# Patient Record
Sex: FEMALE | Race: WHITE | Employment: FULL TIME | URBAN - METROPOLITAN AREA
[De-identification: names, ages, dates, MRNs, and addresses within clinical notes are randomized per-mention and may not be internally consistent; named-entity substitution may affect disease eponyms.]

---

## 2017-09-20 ENCOUNTER — HOSPITAL ENCOUNTER (OUTPATIENT)
Dept: PHYSICAL THERAPY | Age: 42
Discharge: HOME | End: 2017-09-20
Payer: OTHER GOVERNMENT

## 2017-09-20 PROCEDURE — 97162 PT EVAL MOD COMPLEX 30 MIN: CPT

## 2017-09-20 PROCEDURE — 97110 THERAPEUTIC EXERCISES: CPT

## 2017-09-20 PROCEDURE — 97530 THERAPEUTIC ACTIVITIES: CPT

## 2017-09-20 PROCEDURE — 97014 ELECTRIC STIMULATION THERAPY: CPT

## 2017-09-20 NOTE — PROGRESS NOTES
Jim [address]oudreaux [address]  [facility] PHYSICAL THERAPY  [address] [facility], [address] - Phone: [phone]  Fax: [phone] / [address] [facility]  Patient Name: Martin Salinas : 1975   Medical   Diagnosis: Lower back pain [M54.5] Treatment Diagnosis: LBP, Bilateral SIJ pain   Onset Date:  with recent exacerbation AGE: 36 y.o. Referral Source: Unknown, Provider, MD Start of Care [facility]): 2017   Prior Hospitalization: See medical history Provider #: [id number]   Prior Level of Function: Independent with limitations with high level activity/recreation   Comorbidities: L5-S1 Discectomy    Medications: Verified on Patient Summary List   The Plan of Care and following information is based on the information from the initial evaluation.   =======================================================================================  Assessment / key information:  Patient is a 36 y.o. female who presents with chief complaint of low back pain. Pt reports initial onset of LBP in  stating \"back went out\" following lifting incident. Patient reports episode of steroid injections were used to address pain. In , pt reports pain began to progress and underwent L5-S1 discectomy in . Patient reports significant improvement in symptoms following surgery however experienced setback in  with progressive worsening of LBP symptoms that peaked following attempt to run 5K this past summer. Pt reports she was bed ridden x 2 weeks due to severe pain. During evaluation, patient demonstrates dec lumbar extension AROM, increased pain with both flexion and extension repeated movement screen, dec LE strength L > R, 1+ hyporeflexia BLE, extreme tenderness to palpation bilateral SIJ, TTP bilateral lumbar paraspinals, and + thigh thrust/gapping/compression tests indicative of SIJ pathology.  Patient reports that symptoms limit prolonged standing, sitting, painful transfers, decreased ambulation tolerance and inability to engage in normal hobbies and recreational activities, running in particular. Patient would benefit from skilled PT services to address these issues and improve the above mentioned impairment. Thank you for this referral       ** In addition, patient c/o saddle anaesthesia and difficulty with bowel movements. This, in addition to BLE hyporeflexia and reports of BLE weakness make me concerned over possible cauda equina syndrome. Patient may benefit from MRI prior to PT treatment in order to address this concern.     Objective Data:     LE Strength         Strength (1-5)  Hip Left Right   Flexion (L1,L2) 4 5   Extension 3+ 3+   Abduction 4- 4   Adduction       ER 4+ 4+   IR 4+ 4+   Knee Left Right   Extension (L3,L4) 5 5   Flexion (S1,S2) 4 4   Ankle Left Right   PF (S1)       DF (L4) 5 5      ======================================================================================  Eval Complexity: History: HIGH Complexity :3+ comorbidities / personal factors will impact the outcome/ POC Exam:HIGH Complexity : 4+ Standardized tests and measures addressing body structure, function, activity limitation and / or participation in recreation  Presentation: MEDIUM Complexity : Evolving with changing characteristics  Clinical Decision Making:MEDIUM Complexity : FOTO score of 26-74Overall Complexity:MEDIUM  Problem List: pain affecting function, decrease ROM, decrease strength, edema affecting function, impaired gait/ balance, decrease ADL/ functional abilitiies, decrease activity tolerance, decrease flexibility/ joint mobility and decrease transfer abilities   Treatment Plan may include any combination of the following: Therapeutic exercise, Therapeutic activities, Neuromuscular re-education, Physical agent/modality, Gait/balance training, Manual therapy, Aquatic therapy, Patient education, Self Care training, Functional mobility training, Home safety training and Stair training  Patient / Family readiness to learn indicated by: asking questions, trying to perform skills and interest  Persons(s) to be included in education: patient (P)  Barriers to Learning/Limitations: None  Measures taken:    Patient Goal (s): \"strength back, more active like before. \"   Patient self reported health status: good  Rehabilitation Potential: good   Short Term Goals: To be accomplished in  3-4  Weeks:  1. Patient will be compliant with HEP in order to facilitate progression of therapeutic exercise and improve mobility  2. Patient will improve gross LE strength to >/= 4-5 in order to decrease stress on low back  3. Patient will report at least 25% improvement in pain symptoms since IE to improve quality of life and ease with ADLs     Long Term Goals: To be accomplished in  6-8  Weeks:  1. Patient will be independent with HEP in order to demonstrate ability to perform therapeutic exercises and continue progressing functional mobility upon D/C  2. Patient will report ability to walk > 1 mile without inc LBP/SIJ symptoms to improve ease with community ambulation  3. Patient will improve FOTO score to 70% to demonstrate a meaningful improvement in functional mobility and increased quality of life  4. Patient will report at least 75% improvement in sitting/standing tolerance to improve ability to perform work duties  5. Patient will demonstrate ability to initiate return to running program to return to recreational activities      Frequency / Duration:   Patient to be seen  2  times per week for 4-8  weeks:  Patient / Caregiver education and instruction: self care, activity modification and exercises  G-Codes (GP): n/a  Therapist Signature:  Ami Andrade DPT Date: 6/57/4819   Certification Period: n/a Time: 12:43 PM   ===========================================================================================  I certify that the above Physical Therapy Services are being furnished while the patient is under my care. I agree with the treatment plan and certify that this therapy is necessary. Physician Signature:        Date:       Time:     Please sign and return to [employer] or you may fax the signed copy to [phone]. Thank you.

## 2017-09-20 NOTE — PROGRESS NOTES
PHYSICAL THERAPY - DAILY TREATMENT NOTE    Patient Name: Min Gale        Date: 2017  : 1975   YES Patient  Verified  Visit #:   1     Insurance: Payor:  / Plan: [employer] [facility] [address] AND DEPENDENTS / Product Type:  /      In time: 3:05 Out time: 4:25   Total Treatment Time: 80 min     TREATMENT AREA =  Low back pain    SUBJECTIVE    Pain Level (on 0 to 10 scale):  5  / 10   Medication Changes/New allergies or changes in medical history, any new surgeries or procedures? NO    If yes, update Summary List   Subjective Functional Status/Changes:  []  No changes reported     Patient reports her \"back went out in \" when she was carrying something heavy. States that would get shots to address pain. Started having more pain in  and pain got progressively worse. Pt stated she started going through physical therapy and tried lumbar traction which made it worse. Stated that she was prescribed heavy medication and epidurals and a pain block. Stated that she lost feeling in her R leg and they decided to do surgery. Patient stated she had a discectomy in . Pt continued to have some slow reflexes on the right side but strength was OK. States her back locked on her again in . She tried to run a 5K over this past summer and after it was over she \"couldn't move for over a week or two. \" Patient states that her back tweaked and \"went out\" again just bending over to  her boots. States she got a shot of Tramadol 2 weeks ago. Patient states she has been favoring her back since then. She states that she has been using a TENS unit which has helped a lot. Pt reports achiness the front of her legs as well when she sits for a long time. States she gets severe spasms in her R calf as well.        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 Sleeps supine with BLE lifted    Symptoms:  Pain rating (0-10): Today: 5/10   Best: 0/10    Worst:      [] Contstant:    [x] Intermittent:     Aggravated by: squatting   [] Bending [x] Sitting [x] Standing [] Walking   [] Moving [] Cough [] Sneeze [] Valsalva   [] AM  [] PM  Lying:  [] sup   [] pro   [] sidelying   [] Other:     Eased by:  Supine with BLEs on wedge, flexeril, TENS   [] Bending [] Sitting [] Standing [] Walking   [] Moving [] AM  [] PM  Lying: [] sup  [] pro  [] sidelying   [] Other:     General Health:  Red Flags Indicated? [] Yes    [] No  [] Yes [x] No Recent weight change (If yes, due to dieting?  [] Yes  [] No)   [] Yes [x] No Weakness in legs during walking  [] Yes [x] No Unremitting pain at night  [] Yes [x] No Abdominal pain or problems  [] Yes [x] No Rectal bleeding  [] Yes [x] No Feet more cold or painful in cold weather  [] Yes [x] No Menstrual irregularities  [] Yes [x] No Blood or pain with urination  [] Yes [x] No Dysfunction of bowel or bladder  [] Yes [x] No Recent illness within past 3 weeks (i.e, cold, flu)  [] Yes [x] No Numbness/tingling in buttock/genitalia region    Past History/Treatments:     Diagnostic Tests: [] Lab work [] X-rays    [] CT [] MRI     [] Other:  Results: n/a    OBJECTIVE   Posture:  Lateral Shift: [x] R    [x] L     [] +  [x] -  Kyphosis: [] Increased [] Decreased   [x]  WNL  Lordosis:  [] Increased [x] Decreased   [] WNL  Pelvic symmetry: [x] WNL    [] Other:    Gait:  [x] Normal     [] Abnormal:    Active Movements: [] N/A   [] Too acute   [] Other:  ROM AROM Comments:pain, area   Forward flexion 40-60 WNL    Extension 20-30 25% decr pain   SB right 20-30 WNL    SB left 20-30 WNL    Rotation right 5-10 WNL    Rotation left 5-10 WNL        Repeated Movements  Effects on present pain: produces (KY), abolishes (A), increases (incr), decreases(decr), centralizes (C), peripheral (PH), no effect (NE)   Pre-Test Sx Flexion Repeated Flexion Extension Repeated Extension Repeated SBL Repeated SBR   Sitting  Incr Incr incr incr NT NT   Standing  NE NE NE NE Incr NE   Lying  NT  Incr  N/A N/A   Comments: L posterior hip pain with repeated flexion, similar with extension      LE Strength         Strength (1-5)  Hip Left Right   Flexion (L1,L2) 4 5   Extension 3+ 3+   Abduction 4- 4   Adduction     ER 4+ 4+   IR 4+ 4+   Knee Left Right   Extension (L3,L4) 5 5   Flexion (S1,S2) 4 4   Ankle Left Right   PF (S1)     DF (L4) 5 5     Neuro Screen [] WNL  Myotome/Dermatome/Reflexes: Dec RLE knee ext, dec LE strength per above    Dural Mobility:  SLR Sitting: [] R    [] L    [] +    [x] -  @ (degrees):           Supine: [] R    [] L    [] +    [x] -  @ (degrees):   Slump Test: [] R    [] L    [] +    [x] -  @ (degrees):   Prone Knee Bend: [] R    [] L    [] +    [x] -     Palpation  [] Min  [] Mod  [x] Severe    Location: SIJ bilaterally, glut med/min bilaterally  [] Min  [] Mod  [] Severe    Location:  [] Min  [] Mod  [] Severe    Location:    Special Tests    Sacroilliac:  Gaenslen's: [x] R    [x] L    [x] +    [] -     Compression: [x] +    [] -     Gapping:  [x] +    [] -     Thigh Thrust: [x] R    [x] L    [x] +    [] -     Leg Length: [] +    [x] -   Position: supine and hooklying    Crests: WNL    ASIS: Equal    PSIS:    Sacral Sulcus: Painful palpation bilaterally         Hip: Estela Betty:  [x] R    [x] L    [] +    [x] -     Scour:  [x] R    [x] L    [] +    [x] -     Piriformis: [x] R    [x] L    [] +    [x] -    Modalities Rationale:  10 min [x] Estim, type/location:  IFC to L/S and SIJ  Bilaterally supine with MHP                                    []  att     [x]  unatt     []  w/US     []  w/ice    [x]  w/heat    min []  Mechanical Traction: type/lbs                   []  pro   []  sup   []  int   []  cont    []  before manual    []  after manual    min []  Ultrasound, settings/location:      min []  Iontophoresis w/ dexamethasone, location:                                               []  take home patch       []  in clinic    min []  Ice     []  Heat    location/position:     min []  Vasopneumatic Device, press/temp:     min []  Other:    [x] Skin assessment post-treatment (if applicable):    [x]  intact    []  redness- no adverse reaction     []redness  adverse reaction:      10 min Therapeutic Exercise:  [x]  See flow sheet   Rationale:      increase ROM, increase strength, improve coordination, improve balance and increase proprioception to improve the patients ability to perform ADL's and functional mobility with increased ease and efficiency of movement      8 min Therapeutic Activity: FOTO   Rationale: To assess current functional limitations and review POC    throughout min Patient Education:  YES  Reviewed HEP   []  Progressed/Changed HEP based on:         Other Objective/Functional Measures:    See objective data above     Post Treatment Pain Level (on 0 to 10) scale:   0  / 10     ASSESSMENT    Assessment/Changes in Function:     Patient History: High (Hx L5-S1 discectomy, Hx failed conservative treatment)  Examination (low 1-2, mod 3+, high 4+): High per objective above  Clinical Presentation (low stable or uncomplicated, mod evolving or changing, high unstable or unpredictable): High    Clinical Decision Making (low , mod 26-74, high 1-25): FOTO Moderate     [x]  See Progress Note/Recertification   Patient will continue to benefit from skilled PT services to modify and progress therapeutic interventions, address functional mobility deficits, address ROM deficits, address strength deficits, analyze and address soft tissue restrictions, analyze and cue movement patterns, analyze and modify body mechanics/ergonomics, assess and modify postural abnormalities, address imbalance/dizziness and instruct in home and community integration to attain remaining goals.    Progress toward goals / Updated goals:    See plan of care     PLAN    [x]  Upgrade activities as tolerated YES Continue plan of care   []  Discharge due to :    []  Other:

## 2017-09-22 ENCOUNTER — HOSPITAL ENCOUNTER (OUTPATIENT)
Dept: PHYSICAL THERAPY | Age: 42
Discharge: HOME | End: 2017-09-22
Payer: OTHER GOVERNMENT

## 2017-09-22 PROCEDURE — 97530 THERAPEUTIC ACTIVITIES: CPT

## 2017-09-22 PROCEDURE — 97110 THERAPEUTIC EXERCISES: CPT

## 2017-09-22 NOTE — PROGRESS NOTES
PHYSICAL THERAPY - DAILY TREATMENT NOTE    Patient Name: Partha Cordova        Date: 2017  : 1975   YES Patient  Verified  Visit #:   2     Insurance: Payor:  / Plan: Kristopher Mascorro DEPENDENTS / Product Type:  /      In time: 2:11 Out time: 3:26   Total Treatment Time: 76     Medicare Time Tracking (below)   Total Timed Codes (min):  NA 1:1 Treatment Time:  NA     TREATMENT AREA =  Lower back pain [M54.5]    SUBJECTIVE    Pain Level (on 0 to 10 scale):  3  / 10   Medication Changes/New allergies or changes in medical history, any new surgeries or procedures? NO     If yes, update Summary List   Subjective Functional Status/Changes:  []  No changes reported     \"I have a lot of pain back here right where he was touching last time. \"          OBJECTIVE    Modalities Rationale:  palliative      min [] Estim, type/location:                                      []  att     []  unatt     []  w/US     []  w/ice    []  w/heat    min []  Mechanical Traction: type/lbs                   []  pro   []  sup   []  int   []  cont    []  before manual    []  after manual    min []  Ultrasound, settings/location:      min []  Iontophoresis w/ dexamethasone, location:                                               []  take home patch       []  in clinic   10 min [x]  Ice     []  Heat    location/position: Prone, sacrum    min []  Vasopneumatic Device, press/temp:     min []  Other:    [x] Skin assessment post-treatment (if applicable):   [x]  intact    []  redness- no adverse reaction     []redness  adverse reaction:    40 min Therapeutic Exercise:  [x]  See flow sheet   Rationale:      increase ROM and increase strength to improve the patients ability to perform ADL's with improve pelvic and core stability.      25 min Manual Therapy: MET to correct L upshift and anteior rotated L ilium- R over L, soft-tissue massage B adductors   Rationale:      decrease pain, increase ROM and increase tissue extensibility to improve patient's ability to perform ADL's with improved activity tolerance. min Patient Education:  YES  Reviewed HEP   []  Progressed/Changed HEP based on:   Patient reports compliance     Other Objective/Functional Measures:    Pt symptoms made no change with R over L MET, L upshift correction abolished symptoms in standing; however, pt still is TTP over B PSIS especially in supine. Pt is also TTP over B adductors which decreased after manual interventions described above. See flowsheet for more details. Post Treatment Pain Level (on 0 to 10) scale:   0  / 10     ASSESSMENT    Assessment/Changes in Function:     Pt presents to PT making good progress in symptom management and able to iniate a core stability program without an increase in symptoms. []  See Progress Note/Recertification   Patient will continue to benefit from skilled PT services to modify and progress therapeutic interventions, address functional mobility deficits, address ROM deficits, address strength deficits, analyze and address soft tissue restrictions, analyze and cue movement patterns and analyze and modify body mechanics/ergonomics to attain remaining goals.    Progress toward goals / Updated goals:    First follow-up since eval.     PLAN    [x]  Upgrade activities as tolerated YES Continue plan of care   []  Discharge due to :    []  Other:      Therapist: Leticia Martinez    Date: 9/22/2017 Time: 3:45 PM     Future Appointments  Date Time Provider Jj Guevara   9/25/2017 2:30 PM [facility]   [facility]   10/2/2017 2:30 PM Kali LOPEZ [facility]   10/4/2017 2:30  [facility]   10/16/2017 10:00 AM Ingrid Marquez [address] [facility]   10/18/2017 2:00 PM [facility]   10/23/2017 2:30 PM [facility]   10/25/2017 9:30 AM [facility]

## 2017-09-25 ENCOUNTER — HOSPITAL ENCOUNTER (OUTPATIENT)
Dept: PHYSICAL THERAPY | Age: 42
Discharge: HOME | End: 2017-09-25
Payer: OTHER GOVERNMENT

## 2017-09-25 PROCEDURE — 97110 THERAPEUTIC EXERCISES: CPT

## 2017-09-25 PROCEDURE — 97140 MANUAL THERAPY 1/> REGIONS: CPT

## 2017-09-25 PROCEDURE — 97014 ELECTRIC STIMULATION THERAPY: CPT

## 2017-09-25 NOTE — PROGRESS NOTES
PHYSICAL THERAPY - DAILY TREATMENT NOTE      Patient Name: Kathryn Tyson        Date: 2017  : 1975   YES Patient  Verified  Visit #:   3   of     Insurance: Payor:  / Plan: [employer] [place] DEPENDENTS / Product Type:  /      In time: 2:18 Out time: 3:25   Total Treatment Time: 67 min       TREATMENT AREA =  Lower back pain [M54.5]    SUBJECTIVE    Pain Level (on 0 to 10 scale):  -3  / 10   Medication Changes/New allergies or changes in medical history, any new surgeries or procedures? NO    If yes, update Summary List   Subjective Functional Status/Changes:  []  No changes reported     \"It felt really good when I left last time. It started to hurt later in the day and I took it easy over the weekend. It feels good now. \"        OBJECTIVE    Modalities Rationale:  15 min [x] Estim, type/location: IFC to bilateral SIJ supine BLEs on wedge                                     []  att     [x]  unatt     []  w/US     []  w/ice    [x]  w/heat    min []  Mechanical Traction: type/lbs                   []  pro   []  sup   []  int   []  cont    []  before manual    []  after manual    min []  Ultrasound, settings/location:      min []  Iontophoresis w/ dexamethasone, location:                                               []  take home patch       []  in clinic    min []  Ice     []  Heat    location/position:     min []  Vasopneumatic Device, press/temp:     min []  Other:    [] Skin assessment post-treatment (if applicable):    [x]  intact    []  redness- no adverse reaction     []redness  adverse reaction:      44 min Therapeutic Exercise:  [x]  See flow sheet   Rationale:      increase ROM, increase strength, improve coordination, improve balance and increase proprioception to improve the patients ability to increase pt's stability/mobility and improve functional activity and ability to perform ADL's    8 min Manual Therapy: STM/TrP bilateral adductors, shotgun technique   Rationale:      decrease pain, increase ROM, increase tissue extensibility, decrease trigger points and increase postural awareness to improve patient's ability to perform functional activities and decrease pain. 1 min Patient Education:  YES  Reviewed HEP   []  Progressed/Changed HEP based on: Other Objective/Functional Measures:     Pt with continued TTP over bilateral gluteals along origin at iliac crest. Audible cavitation L pubis with shotgun. Pelvic asymmetry assessed and no obliquity found upon palpation today. Progressed LE/core therex per flowsheet  Tolerated walking TA marches well. Added 5# KB hold during second set and patient tolerated without inc pain. Post Treatment Pain Level (on 0 to 10) scale:   0  / 10     ASSESSMENT    Assessment/Changes in Function:     Patient progressing tolerance for LE/core therex. Will continue to progress reps/sets to improve muscular endurance and to improve. Patient continues to respond very well to use of IFC/TENS to low back/SIJ. Patient would benefit from home TENS unit to decrease pain and improved tolerance for ADL and self care tasks. []  See Progress Note/Recertification   Patient will continue to benefit from skilled PT services to modify and progress therapeutic interventions, address functional mobility deficits, address ROM deficits, address strength deficits, analyze and address soft tissue restrictions, analyze and cue movement patterns, analyze and modify body mechanics/ergonomics, assess and modify postural abnormalities, address imbalance/dizziness and instruct in home and community integration to attain remaining goals. Progress toward goals / Updated goals: · Short Term Goals: To be accomplished in  3-4  Weeks:  1. Patient will be compliant with HEP in order to facilitate progression of therapeutic exercise and improve mobility  2.  Patient will improve gross LE strength to >/= 4-5 in order to decrease stress on low back. Progressed core and LE therex  3. Patient will report at least 25% improvement in pain symptoms since IE to improve quality of life and ease with ADLs.        PLAN    [x]  Upgrade activities as tolerated YES Continue plan of care   []  Discharge due to :    []  Other:      Therapist: Carmencita Ornelas DPT    Date: 9/25/2017 Time: 10:08 AM     Future Appointments  Date Time Provider Jj Guevara   9/25/2017 2:30 PM [facility]   9/27/2017 3:00 PM [facility]   10/2/2017 2:30 PM [facility]   10/4/2017 2:30 PM [facility]   10/16/2017 10:00 AM Devonte Gene [address] [place] [address]   10/18/2017 2:00 PM [facility]   10/23/2017 2:30 PM [facility]   10/25/2017 9:30 AM [facility]

## 2017-09-27 ENCOUNTER — HOSPITAL ENCOUNTER (OUTPATIENT)
Dept: PHYSICAL THERAPY | Age: 42
Discharge: HOME | End: 2017-09-27
Payer: OTHER GOVERNMENT

## 2017-09-27 PROCEDURE — 97110 THERAPEUTIC EXERCISES: CPT

## 2017-09-27 PROCEDURE — 97140 MANUAL THERAPY 1/> REGIONS: CPT

## 2017-09-27 NOTE — PROGRESS NOTES
PHYSICAL THERAPY - DAILY TREATMENT NOTE    Patient Name: Gena Harrison        Date: 2017  : 1975   YES Patient  Verified  Visit #:   4     Insurance: Payor:  / Plan: Kristopher Mascorro DEPENDENTS / Product Type:  /      In time: 3:00 Out time: 4:00   Total Treatment Time: 60     Medicare Time Tracking (below)   Total Timed Codes (min):  NA 1:1 Treatment Time:  NA     TREATMENT AREA =  Lower back pain [M54.5]    SUBJECTIVE    Pain Level (on 0 to 10 scale):  0  / 10   Medication Changes/New allergies or changes in medical history, any new surgeries or procedures? NO     If yes, update Summary List   Subjective Functional Status/Changes:  []  No changes reported     \"I went to work today and I haven't had any pain. Its a little better. \"          OBJECTIVE    Modalities Rationale:  palliative      min [] Estim, type/location:                                      []  att     []  unatt     []  w/US     []  w/ice    []  w/heat    min []  Mechanical Traction: type/lbs                   []  pro   []  sup   []  int   []  cont    []  before manual    []  after manual    min []  Ultrasound, settings/location:      min []  Iontophoresis w/ dexamethasone, location:                                               []  take home patch       []  in clinic   10 min [x]  Ice     []  Heat    location/position: Supine with LE's elevated    min []  Vasopneumatic Device, press/temp:     min []  Other:    [x] Skin assessment post-treatment (if applicable):   [x]  intact    []  redness- no adverse reaction     []redness  adverse reaction:     42 min Therapeutic Exercise:  [x]  See flow sheet   Rationale:      increase ROM and increase strength to improve the patients ability to perform ADL's with improved core stability.      8 min Manual Therapy: R inominate outflare and R LE upshift MET correction   Rationale:      decrease pain, increase ROM and increase tissue extensibility to improve patient's ability to perform ADL's with improved activity tolerance. min Patient Education:  YES  Reviewed HEP   []  Progressed/Changed HEP based on:   Patient reports compliance     Other Objective/Functional Measures:    Pt had pain with exercises that encourage an APT including a mini-squats with the palloff press, prone quad stretch and standing hip extension. PPT's and a prayer stretch decreased symptoms. Post Treatment Pain Level (on 0 to 10) scale:   0  / 10     ASSESSMENT    Assessment/Changes in Function:     Pt presents to PT with pain associated with SL and APT activities. The pt will continue to progress in [facility] and hamstring/glute strengthening in a neutral spine. []  See Progress Note/Recertification   Patient will continue to benefit from skilled PT services to modify and progress therapeutic interventions, address functional mobility deficits, address ROM deficits, address strength deficits, analyze and address soft tissue restrictions, analyze and cue movement patterns and analyze and modify body mechanics/ergonomics to attain remaining goals. Progress toward goals / Updated goals: Addressed LTG 2 with standing hip extension and abduction.      PLAN    [x]  Upgrade activities as tolerated YES Continue plan of care   []  Discharge due to :    []  Other:      Therapist: Marily Vickers    Date: 9/27/2017 Time: 4:27 PM     Future Appointments  Date Time Provider Jj Guevara   10/2/2017 2:30 PM [facility]   10/4/2017 2:30 PM [address] [place] [address]   10/16/2017 10:00 AM [facility]   10/18/2017 2:00 PM [facility]   10/23/2017 2:30 PM [facility]   10/25/2017 9:30 AM [facility]

## 2017-10-02 ENCOUNTER — HOSPITAL ENCOUNTER (OUTPATIENT)
Dept: PHYSICAL THERAPY | Age: 42
Discharge: HOME | End: 2017-10-02
Payer: OTHER GOVERNMENT

## 2017-10-02 PROCEDURE — 97014 ELECTRIC STIMULATION THERAPY: CPT

## 2017-10-02 PROCEDURE — 97110 THERAPEUTIC EXERCISES: CPT

## 2017-10-02 PROCEDURE — 97530 THERAPEUTIC ACTIVITIES: CPT

## 2017-10-02 NOTE — PROGRESS NOTES
PHYSICAL THERAPY - DAILY TREATMENT NOTE      Patient Name: Geraldine Ortiz        Date: 10/2/2017  : 1975   YES Patient  Verified  Visit #:   5     Insurance: Payor:  / Plan: JoseKristopher Calderon DEPENDENTS / Product Type:  /      In time: 2:27 Out time: 3:45   Total Treatment Time: 78       TREATMENT AREA =  Lower back pain [M54.5]    SUBJECTIVE     Pain Level (on 0 to 10 scale):  0  / 10   Medication Changes/New allergies or changes in medical history, any new surgeries or procedures? NO    If yes, update Summary List   Subjective Functional Status/Changes:  []  No changes reported     \"It was a little sore over the weekend, not too bad though. I did the stretches and exercises and they helped. \"        OBJECTIVE    Modalities Rationale:  15 min [x] Estim, type/location: IFC to L/S supine BLEs on wedge                                     []  att     [x]  unatt     []  w/US     []  w/ice    [x]  w/heat    min []  Mechanical Traction: type/lbs                   []  pro   []  sup   []  int   []  cont    []  before manual    []  after manual    min []  Ultrasound, settings/location:      min []  Iontophoresis w/ dexamethasone, location:                                               []  take home patch       []  in clinic    min []  Ice     []  Heat    location/position:     min []  Vasopneumatic Device, press/temp:     min []  Other:    [] Skin assessment post-treatment (if applicable):    [x]  intact    []  redness- no adverse reaction     []redness  adverse reaction:      53 min Therapeutic Exercise:  [x]  See flow sheet   Rationale:      increase ROM, increase strength, improve coordination, improve balance and increase proprioception to improve the patients ability to increase pt's stability/mobility and improve functional activity and ability to perform ADL's    10 min Therapeutic Activity: Walking marches with 5# KB hold, hip hikes, quad LE alt   Rationale: decrease pain, increase ROM, increase tissue extensibility and increase postural awareness to improve patient's ability to return to hobbies/recreational activities with dec pain. 1 min Patient Education:  YES  Reviewed HEP   []  Progressed/Changed HEP based on: Other Objective/Functional Measures:    Significant difficulty with SL hip abduction and prone hip extension  Progressed prone HS curls to 1# cuff weights; pillow placed under pelvis for all prone therex  Able to perform quadruped alt LE without inc SIJ pain  5# KB hold in goblet position with walking marches 2 x 30'     Post Treatment Pain Level (on 0 to 10) scale:   0  / 10     ASSESSMENT    Assessment/Changes in Function:     Patient progressing in hip abd/ext strength and improved tolerance for therex progression. []  See Progress Note/Recertification   Patient will continue to benefit from skilled PT services to modify and progress therapeutic interventions, address functional mobility deficits, address ROM deficits, address strength deficits, analyze and address soft tissue restrictions, analyze and cue movement patterns, analyze and modify body mechanics/ergonomics, assess and modify postural abnormalities, address imbalance/dizziness and instruct in home and community integration to attain remaining goals. Progress toward goals / Updated goals: · Short Term Goals: To be accomplished in  3-4  Weeks:  1. Patient will be compliant with HEP in order to facilitate progression of therapeutic exercise and improve mobility  2. Patient will improve gross LE strength to >/= 4-5 in order to decrease stress on low back. Progressed hip abd/ext therex today  3.  Patient will report at least 25% improvement in pain symptoms since IE to improve quality of life and ease with ADLs          PLAN    [x]  Upgrade activities as tolerated YES Continue plan of care   []  Discharge due to :    []  Other:      Therapist: Rosa Souza DPT    Date: 10/2/2017 Time: 7:51 AM     Future Appointments  Date Time Provider Jj Guevara   10/2/2017 2:30 PM [facility]   10/4/2017 2:30 PM [facility]   10/16/2017 10:00 AM [facility]   10/18/2017 2:00 PM [facility]   10/23/2017 2:30 PM [facility]   10/25/2017 9:30 AM [facility]

## 2017-10-04 ENCOUNTER — HOSPITAL ENCOUNTER (OUTPATIENT)
Dept: PHYSICAL THERAPY | Age: 42
Discharge: HOME | End: 2017-10-04
Payer: OTHER GOVERNMENT

## 2017-10-04 PROCEDURE — 97014 ELECTRIC STIMULATION THERAPY: CPT

## 2017-10-04 PROCEDURE — 97530 THERAPEUTIC ACTIVITIES: CPT

## 2017-10-04 PROCEDURE — 97110 THERAPEUTIC EXERCISES: CPT

## 2017-10-04 NOTE — PROGRESS NOTES
PHYSICAL THERAPY - DAILY TREATMENT NOTE    Patient Name: Aj Diallo        Date: 10/4/2017  : 1975   YES Patient  Verified  Visit #:   6     Insurance: Payor:  / Plan: Kristopher Mascorro DEPENDENTS / Product Type:  /      In time: 2:25 Out time: 3:28   Total Treatment Time: 63     Medicare Time Tracking (below)   Total Timed Codes (min):  NA 1:1 Treatment Time:  NA     TREATMENT AREA =  Lower back pain [M54.5]    SUBJECTIVE    Pain Level (on 0 to 10 scale):  3  / 10   Medication Changes/New allergies or changes in medical history, any new surgeries or procedures? NO     If yes, update Summary List   Subjective Functional Status/Changes:  []  No changes reported     \"I'm a little more sore. I don't know what I did at work. That one tender spot in my back. \"          OBJECTIVE    Modalities Rationale:  paliative     15 min [] Estim, type/location: See below L/S                                     []  att     []  unatt     []  w/US     []  w/ice    []  w/heat    min []  Mechanical Traction: type/lbs                   []  pro   []  sup   []  int   []  cont    []  before manual    []  after manual    min []  Ultrasound, settings/location:      min []  Iontophoresis w/ dexamethasone, location:                                               []  take home patch       []  in clinic   15 min [x]  Ice     []  Heat    location/position: IFC in prone    min []  Vasopneumatic Device, press/temp:     min []  Other:    [x] Skin assessment post-treatment (if applicable):   []  intact    []  redness- no adverse reaction     []redness  adverse reaction:      40 min Therapeutic Exercise:  [x]  See flow sheet   Rationale:      increase ROM and increase strength to improve the patients ability to perform ADL's with improved core stability. 8 min Therapeutic Activity: Deadlifts, KB OH carries, walking lunges. Rationale:    To increase safety and effiiency with ADL's     min Patient Education:  YES  Reviewed HEP   []  Progressed/Changed HEP based on:   Patient reports compliance     Other Objective/Functional Measures:    Pt still has pain with L/S hyper extension and sacral nutation that is relieved with counternutation and specifically the prayer stretch. Pt's R hamstring went into spasm with increased LBP when performing prone glute kicks even with pillows under her hips. Pt required max vc's to perform deadlifts with appropriate form; however, pt could perform symptom-free. Post Treatment Pain Level (on 0 to 10) scale:   2  / 10     ASSESSMENT    Assessment/Changes in Function:     Pt presents to PT progressing slowly due to decreased activity tolerance for hip extension with functional activity performance. Progressing per activity tolerance. []  See Progress Note/Recertification   Patient will continue to benefit from skilled PT services to modify and progress therapeutic interventions, address functional mobility deficits, address ROM deficits, address strength deficits, analyze and address soft tissue restrictions, analyze and cue movement patterns and analyze and modify body mechanics/ergonomics to attain remaining goals. Progress toward goals / Updated goals: Addressed LTG 2 with initiated walking lunges and deadlifts.      PLAN    [x]  Upgrade activities as tolerated YES Continue plan of care   []  Discharge due to :    []  Other:      Therapist: Ameena Jack    Date: 10/4/2017 Time: 2:27 PM     Future Appointments  Date Time Provider Jj Guevara   10/4/2017 2:30 PM [address] [place] [address]   10/16/2017 10:00 AM Ameena Jack [facility]   10/18/2017 2:00 PM [facility]   10/23/2017 2:30 PM [facility]   10/25/2017 9:30 AM [facility]

## 2017-10-16 ENCOUNTER — HOSPITAL ENCOUNTER (OUTPATIENT)
Dept: PHYSICAL THERAPY | Age: 42
Discharge: HOME | End: 2017-10-16
Payer: OTHER GOVERNMENT

## 2017-10-16 PROCEDURE — 97140 MANUAL THERAPY 1/> REGIONS: CPT

## 2017-10-16 PROCEDURE — 97110 THERAPEUTIC EXERCISES: CPT

## 2017-10-16 NOTE — PROGRESS NOTES
[address]  [facility] PHYSICAL THERAPY  [address] [facility] [place], [address] - Phone: [phone]  Fax: [phone] [address]          Patient Name: Keily Alvarado : 1975   Treatment/Medical Diagnosis: Lower back pain [M54.5]   Onset Date:  with recent exacerbation    Referral Source: Sandee Arellano, * Start of Care [facility]): 17   Prior Hospitalization: See Medical History Provider #: [id number]   Prior Level of Function: Independent with limitations with high level activity/recreation   Comorbidities: L5-S1 Discectomy    Medications: Verified on Patient Summary List   Visits from [facility]: 7 Missed Visits: 0     Goal/Measure of Progress Goal Met? 1. Patient will be compliant with HEP in order to facilitate progression of therapeutic exercise and improve mobility   Status at last Eval: NA Current Status: compliant yes   2. Patient will improve gross LE strength to >/= 4-5 in order to decrease stress on low back   Status at last Eval: 3+ hip extension Current Status: See below progressing   3. Patient will report at least 25% improvement in pain symptoms since IE to improve quality of life and ease with ADLs   Status at last Eval: NA Current Status: 60% progressing   4. Patient will improve FOTO score to 70% to demonstrate a meaningful improvement in functional mobility and increased quality of life   Status at last Eval: 54 Current Status: 63 yes     Therapy has consisted of muscle energy techniques and other manual interventions to correct inominate/sacroiliac positional dysfunctions. The pt has also performed several mat and standing strengthening exercises, within pt tolerance. The pt was issued a home TENS unit to address symptoms. Key Functional Changes/Progress: Pt's FOTO score improved to 63 indicating 37% limitation in functional ability.   Manual interventions corrected the pt's alignment; however, the pt is very unstable and presents to PT with positional dysfunctions for multiple sessions. The pt's symptoms, when provoked, are less in intensity and are still provoked in positions of L/S extension and anterior pelvic tilt. The pt's symptoms decrease with posterior pelvic tilt; however, the pt's decreased core stability makes difficulty the pt's ability to perform glute strengthening exercises without pain. The pt demonstrates many asymmetrical MMT hip girdle strength deficits detailed below. \    MMT Deficits  Hip Flexion 4+ B  Hip Extension 4- R 3 + L  Hip IR 4 R 4- L  Hip ER 3+ B    Problem List: pain affecting function, decrease strength, impaired gait/ balance, decrease ADL/ functional abilitiies, decrease activity tolerance and decrease flexibility/ joint mobility   Treatment Plan may include any combination of the following: Therapeutic exercise, Therapeutic activities, Neuromuscular re-education, Physical agent/modality, Gait/balance training, Manual therapy, Aquatic therapy, Patient education, Self Care training, Functional mobility training, Home safety training and Stair training   Goals for this certification period include and are to be achieved in   3-4  weeks:  1. Patient will be independent with HEP in order to demonstrate ability to perform therapeutic exercises and continue progressing functional mobility upon D/C.  2.   Patient will improve FOTO score to 70% to demonstrate a meaningful improvement in functional mobility and increased quality of life  3. Patient will improve gross LE strength to >/= 4-5 in order to decrease stress on low back  Frequency / Duration:   Patient to be seen   2-3   times per week for   4-6    weeks:  G-Maria Del Carmen (GP): NA  Assessments/Recommendations: The patient would continue to benefit from skilled interventions to address the above functional deficits. If you have any questions/comments please contact us directly at [phone].    Thank you for allowing us to assist in the care of your patient. Therapist Signature: Karen Buck DPT Date: 35/16/3211   Certification Period:  Reporting Period: NA  9/20/17 - 10/16/17 Time: 10:04 AM   NOTE TO PHYSICIAN:  PLEASE COMPLETE THE ORDERS BELOW AND FAX TO   [facility]: [phone]. If you are unable to process this request in 24 hours please contact our office: [phone].    ___ I have read the above report and request that my patient continue as recommended.   ___ I have read the above report and request that my patient continue therapy with the following changes/special instructions: ________________________________________________   ___ I have read the above report and request that my patient be discharged from therapy.      Physician Signature:        Date:       Time:

## 2017-10-16 NOTE — PROGRESS NOTES
PHYSICAL THERAPY - DAILY TREATMENT NOTE    Patient Name: Davian Ortiz        Date: 10/16/2017  : 1975   YES Patient  Verified  Visit #:   7     Insurance: Payor:  / Plan: Kristopher Mascorro DEPENDENTS / Product Type:  /      In time: 10:00 Out time: 10:46   Total Treatment Time: 46     Medicare Time Tracking (below)   Total Timed Codes (min):  NA 1:1 Treatment Time:  NA     TREATMENT AREA =  Lower back pain [M54.5]    SUBJECTIVE    Pain Level (on 0 to 10 scale):  2  / 10   Medication Changes/New allergies or changes in medical history, any new surgeries or procedures? NO     If yes, update Summary List   Subjective Functional Status/Changes:  []  No changes reported     \"I did a lot this weekend. Carrying heavy boxes setting up for this  we thought we were only attending and ending up helping plan. \"          OBJECTIVE    38 min Therapeutic Exercise:  [x]  See flow sheet   Rationale:      increase ROM and increase strength to improve the patients ability to perform ADL's with improved core stability. 8 min Manual Therapy: MET to correct R on L inominate rotation, L LE le pull   Rationale:      decrease pain, increase ROM and increase tissue extensibility to improve patient's ability to perform ADL's without positional dysfuncitons. min Patient Education:  YES  Reviewed HEP   []  Progressed/Changed HEP based on:   Patient reports compliance     Other Objective/Functional Measures:    See PN.      Post Treatment Pain Level (on 0 to 10) scale:   0  / 10     ASSESSMENT    Assessment/Changes in Function:     See Pn.     []  See Progress Note/Recertification   Patient will continue to benefit from skilled PT services to modify and progress therapeutic interventions, address functional mobility deficits, address ROM deficits, address strength deficits, analyze and address soft tissue restrictions, analyze and cue movement patterns and analyze and modify body mechanics/ergonomics to attain remaining goals. Progress toward goals / Updated goals:    See PN.      PLAN    [x]  Upgrade activities as tolerated YES Continue plan of care   []  Discharge due to :    []  Other:      Therapist: Nida Street    Date: 10/16/2017 Time: 10:51 AM     Future Appointments  Date Time Provider Jj Guevara   10/18/2017 2:[facility] PM [facility]   10/23/2017 2:30 PM [facility]   10/25/2017 9:30 AM [facility]

## 2017-10-18 ENCOUNTER — HOSPITAL ENCOUNTER (OUTPATIENT)
Dept: PHYSICAL THERAPY | Age: 42
End: 2017-10-18
Payer: OTHER GOVERNMENT

## 2017-10-23 ENCOUNTER — HOSPITAL ENCOUNTER (OUTPATIENT)
Dept: PHYSICAL THERAPY | Age: 42
Discharge: HOME | End: 2017-10-23
Payer: OTHER GOVERNMENT

## 2017-10-23 PROCEDURE — 97530 THERAPEUTIC ACTIVITIES: CPT

## 2017-10-23 PROCEDURE — 97110 THERAPEUTIC EXERCISES: CPT

## 2017-10-23 NOTE — PROGRESS NOTES
PHYSICAL THERAPY - DAILY TREATMENT NOTE      Patient Name: Sophie Barrera        Date: 10/23/2017  : 1975   YES Patient  Verified  Visit #:     Insurance: Payor:  / Plan: Kristopher Mascorro DEPENDENTS / Product Type:  /      In time: 2:35 Out time: 3:35   Total Treatment Time: 60 min     TREATMENT AREA =  Lower back pain [M54.5]    SUBJECTIVE    Pain Level (on 0 to 10 scale):  2   10   Medication Changes/New allergies or changes in medical history, any new surgeries or procedures? NO    If yes, update Summary List   Subjective Functional Status/Changes:  []  No changes reported     \"I sat in another chair at work and that threw it off completely. I've been using my sciatic pillow and that helps a lot. \"        OBJECTIVE     45 min Therapeutic Exercise:  [x]  See flow sheet   Rationale:      increase ROM, increase strength, improve coordination, improve balance and increase proprioception to improve the patients ability to increase pt's stability/mobility and improve functional activity and ability to perform ADL's     15 min Therapeutic Activity: Box lifts 17#- 37# from ground. Deadlifts with 20# KB   Rationale:     decrease pain, increase ROM, increase tissue extensibility and increase postural awareness to improve patient's ability to perform bending/lifting with dec LBP    1 min Patient Education:  YES  Reviewed HEP   []  Progressed/Changed HEP based on: Other Objective/Functional Measures:    Box lifts performed from the ground initially without any additional weight (17#) then progressed to 27# x 5 reps and 37# x 5 more reps. Pt with c/o of slight \"burning\" bilateral glutes; abolished following completion of box lifts. Deadlifts performed with relative stiff legs to encourage hip hinge pattern. Pt demo significant round shoulders and thoracic flexion that improve with VC/demo however c/o inc LBP.   Resumed walking  w/ TA draw to improve dynamic core stability with 5# KB     Post Treatment Pain Level (on 0 to 10) scale:   1  / 10     ASSESSMENT    Assessment/Changes in Function:     Patient continues to reports decreasing bilateral SIJ symptoms however this is highly irritable.     []  See Progress Note/Recertification   Patient will continue to benefit from skilled PT services to modify and progress therapeutic interventions, address functional mobility deficits, address ROM deficits, address strength deficits, analyze and address soft tissue restrictions, analyze and cue movement patterns, analyze and modify body mechanics/ergonomics, assess and modify postural abnormalities, address imbalance/dizziness and instruct in home and community integration to attain remaining goals. Progress toward goals / Updated goals:    · Goals for this certification period include and are to be achieved in   3-4  weeks:  1. Patient will be independent with HEP in order to demonstrate ability to perform therapeutic exercises and continue progressing functional mobility upon D/C.  2.   Patient will improve FOTO score to 70% to demonstrate a meaningful improvement in functional mobility and increased quality of life  3.    Patient will improve gross LE strength to >/= 4-5 in order to decrease stress on low back     PLAN    [x]  Upgrade activities as tolerated YES Continue plan of care   []  Discharge due to :    []  Other:      Therapist: Delfino Breen DPT    Date: 10/23/2017 Time: 8:10 AM     Future Appointments  Date Time Provider Jj Guevara   10/23/2017 2:30 PM [facility]   10/25/2017 9:30 AM [facility]

## 2017-10-25 ENCOUNTER — HOSPITAL ENCOUNTER (OUTPATIENT)
Dept: PHYSICAL THERAPY | Age: 42
Discharge: HOME | End: 2017-10-25
Payer: OTHER GOVERNMENT

## 2017-10-25 PROCEDURE — 97530 THERAPEUTIC ACTIVITIES: CPT

## 2017-10-25 PROCEDURE — 97110 THERAPEUTIC EXERCISES: CPT

## 2017-10-25 PROCEDURE — 97140 MANUAL THERAPY 1/> REGIONS: CPT

## 2017-10-25 NOTE — PROGRESS NOTES
PHYSICAL THERAPY - DAILY TREATMENT NOTE      Patient Name: Angel Nelson        Date: 10/25/2017  : 1975   YES Patient  Verified  Visit #:     Insurance: Payor:  / Plan: [employer]on DEPENDENTS / Product Type:  /      In time: 9:30 Out time: 10:35   Total Treatment Time: 65 min       TREATMENT AREA =  Lower back pain [M54.5]    SUBJECTIVE    Pain Level (on 0 to 10 scale):  0  / 10   Medication Changes/New allergies or changes in medical history, any new surgeries or procedures? NO    If yes, update Summary List   Subjective Functional Status/Changes:  []  No changes reported     \"It feels good today. \"        OBJECTIVE    45 min Therapeutic Exercise:  [x]  See flow sheet   Rationale:      increase ROM, increase strength, improve coordination, improve balance and increase proprioception to improve the patients ability to increase pt's stability/mobility and improve functional activity and ability to perform ADL's    10 min Therapeutic Activity: Planks, pallof ABCs, hip abd on foam   Rationale:     decrease pain, increase ROM, increase tissue extensibility and increase postural awareness to improve patient's ability to improve pelvic and core stability with ADLs, and during running    10 min Manual Therapy: IASTM with medium cups to bilateral L/S paraspinals, STM to R glut max origin both in prone position with 2 pillows underneath pelvis   Rationale:      decrease pain, increase ROM, increase tissue extensibility and increase postural awareness to improve patient's ability to perform functional activities and decrease pain. 1 min Patient Education:  YES  Reviewed HEP   []  Progressed/Changed HEP based on:   Hold bird dogs. Added supine and SL SLR and dead bugs     Other Objective/Functional Measures:    Attempted to perform prone glut kicks with pillow under pelvis to dec lumbar ext however pt c/o inc pain following this exercise.   Pt reported relief in L/S stiffness following IASTM  Tactile cueing required to dec hip flexion during SL hip abduction     Post Treatment Pain Level (on 0 to 10) scale:   0  / 10     ASSESSMENT    Assessment/Changes in Function:     Patient progressing in tolerance for core and LE strengthening exercises. Continued pain with L/S extension and repetitions of hip ext. []  See Progress Note/Recertification   Patient will continue to benefit from skilled PT services to modify and progress therapeutic interventions, address functional mobility deficits, address ROM deficits, address strength deficits, analyze and address soft tissue restrictions, analyze and cue movement patterns, analyze and modify body mechanics/ergonomics, assess and modify postural abnormalities, address imbalance/dizziness and instruct in home and community integration to attain remaining goals. Progress toward goals / Updated goals:    · Goals for this certification period include and are to be achieved in   3-4  weeks: 1.   Patient will be independent with HEP in order to demonstrate ability to perform therapeutic exercises and continue progressing functional mobility upon D/C.   2.   Patient will improve FOTO score to 70% to demonstrate a meaningful improvement in functional mobility and increased quality of life  3.   Patient will improve gross LE strength to >/= 4-5 in order to decrease stress on low back       PLAN    [x]  Upgrade activities as tolerated YES Continue plan of care   []  Discharge due to :    []  Other:      Therapist: Wilbert Rodriguez DPT    Date: 10/25/2017 Time: 7:45 AM     Future Appointments  Date Time Provider Jj Guevara   10/25/2017 9:30 AM [facility]   11/1/2017 8:30 AM [facility]   11/7/2017 5:00 PM [facility]   11/9/2017 4:00 PM [facility]   11/14/2017 5:00 PM [facility]   11/16/2017 5:00 PM [facility]   11/20/2017 5:00 PM [facility]   11/22/2017 5:00  [facility]

## 2017-11-01 ENCOUNTER — HOSPITAL ENCOUNTER (OUTPATIENT)
Dept: PHYSICAL THERAPY | Age: 42
Discharge: HOME | End: 2017-11-01
Payer: OTHER GOVERNMENT

## 2017-11-01 PROCEDURE — 97110 THERAPEUTIC EXERCISES: CPT

## 2017-11-01 PROCEDURE — 97530 THERAPEUTIC ACTIVITIES: CPT

## 2017-11-01 NOTE — PROGRESS NOTES
PHYSICAL THERAPY - DAILY TREATMENT NOTE      Patient Name: Hank Noble        Date: 2017  : 1975   YES Patient  Verified  Visit #:   10     Insurance: Payor:  / Plan: [employer] DEPENDENTS / Product Type:  /      In time: 8:30 Out time: 9:35   Total Treatment Time: 65 min       TREATMENT AREA =  Lower back pain [M54.5]    SUBJECTIVE    Pain Level (on 0 to 10 scale):  0  / 10   Medication Changes/New allergies or changes in medical history, any new surgeries or procedures? NO    If yes, update Summary List   Subjective Functional Status/Changes:  []  No changes reported     \"I'm feeling pretty good. I was in [place] for the weekend. I did pretty well. I was fatigued by the end of the day but no pain. That TENS machine is awesome. \"        OBJECTIVE    Modalities Rationale:        min [] Estim, type/location:                                      []  att     []  unatt     []  w/US     []  w/ice    []  w/heat    min []  Mechanical Traction: type/lbs                   []  pro   []  sup   []  int   []  cont    []  before manual    []  after manual    min []  Ultrasound, settings/location:      min []  Iontophoresis w/ dexamethasone, location:                                               []  take home patch       []  in clinic   10 min []  Ice     [x]  Heat    location/position: MHP to L/S supine BLEs on wedge    min []  Vasopneumatic Device, press/temp:     min []  Other:    [x] Skin assessment post-treatment (if applicable):    [x]  intact    []  redness- no adverse reaction     []redness  adverse reaction:      40 min Therapeutic Exercise:  [x]  See flow sheet   Rationale:      increase ROM, increase strength, improve coordination, improve balance and increase proprioception to improve the patients ability to increase pt's stability/mobility and improve functional activity and ability to perform ADL's    15 min Therapeutic Activity: Return to run (walk-run) program   Rationale: To ease into return to running with emphasis on proper mechanics    1 min Patient Education:  YES  Reviewed HEP   []  Progressed/Changed HEP based on: Other Objective/Functional Measures:    Initiated return to run with 5 minute warm-up walk at 3.0 speed. Pt then perform 30' run at 4.5 speed followed by 1 minute walk x 10 minutes. Cool down walk at 3.0 speed x 3 minutes. Pt with reports of slight inc tightness in low back but no pain. Followed this with LE stretching gastroc, quad, HS. Post Treatment Pain Level (on 0 to 10) scale:   0  / 10     ASSESSMENT    Assessment/Changes in Function:     Patient without pain over the weekend. Tolerated initiation of return to run program without inc pain. Plan to continue to progress return to run program before prescribing for HEP. []  See Progress Note/Recertification   Patient will continue to benefit from skilled PT services to modify and progress therapeutic interventions, address functional mobility deficits, address ROM deficits, address strength deficits, analyze and address soft tissue restrictions, analyze and cue movement patterns, analyze and modify body mechanics/ergonomics, assess and modify postural abnormalities and instruct in home and community integration to attain remaining goals. Progress toward goals / Updated goals:    · Goals for this certification period include and are to be achieved in   3-4  weeks: 1.   Patient will be independent with HEP in order to demonstrate ability to perform therapeutic exercises and continue progressing functional mobility upon D/C.   2.   Patient will improve FOTO score to 70% to demonstrate a meaningful improvement in functional mobility and increased quality of life  3.   Patient will improve gross LE strength to >/= 4-5 in order to decrease stress on low back        PLAN    [x]  Upgrade activities as tolerated YES Continue plan of care   []  Discharge due to :    [] Other:      Therapist: Cecilia Harris DPT    Date: 11/1/2017 Time: 8:50 AM     Future Appointments  Date Time Provider Jj Guevara   11/7/2017 5:00 PM [facility]   11/9/2017 4:00 PM [facility]   11/14/2017 5:00 PM [facility]   11/16/2017 5:00 PM [facility]   11/20/2017 5:00 PM [address] [place] [address]   11/22/2017 5:00 PM [address] [place] [address]

## 2017-11-07 ENCOUNTER — HOSPITAL ENCOUNTER (OUTPATIENT)
Dept: PHYSICAL THERAPY | Age: 42
Discharge: HOME | End: 2017-11-07
Payer: OTHER GOVERNMENT

## 2017-11-07 PROCEDURE — 97530 THERAPEUTIC ACTIVITIES: CPT

## 2017-11-07 PROCEDURE — 97110 THERAPEUTIC EXERCISES: CPT

## 2017-11-07 NOTE — PROGRESS NOTES
PHYSICAL THERAPY - DAILY TREATMENT NOTE      Patient Name: Geraldine Ortiz        Date: 2017  : 1975   YES Patient  Verified  Visit #:     Insurance: Payor:  / Plan: Kristopher Mascorro DEPENDENTS / Product Type:  /      In time: 4:55 Out time: 6:00   Total Treatment Time: 65 min       TREATMENT AREA =  Lower back pain [M54.5]    SUBJECTIVE    Pain Level (on 0 to 10 scale):  0  / 10   Medication Changes/New allergies or changes in medical history, any new surgeries or procedures? NO    If yes, update Summary List   Subjective Functional Status/Changes:  []  No changes reported     \"I'm feeling pretty good today. The other day I bent over to feed the dog and I just felt like my back locked up. \"        OBJECTIVE    40 min Therapeutic Exercise:  [x]  See flow sheet   Rationale:      increase ROM, increase strength, improve coordination, improve balance and increase proprioception to improve the patients ability to increase pt's stability/mobility and improve functional activity and ability to perform ADL's    25 min Therapeutic Activity: Lunges, Walk-to run program   Rationale: To improve bending/lifting mechanics, return to running    1 min Patient Education:  YES  Reviewed HEP   []  Progressed/Changed HEP based on: Other Objective/Functional Measures:    Return to run Week 1 performed: progressed to full 1 minute on/1 minute off  Marked RLE glut weakness with SL hip abduction, lunges and hip hikes     Post Treatment Pain Level (on 0 to 10) scale:   0  / 10     ASSESSMENT    Assessment/Changes in Function:     Patient tolerating return to run program well. Provided as HEP to patient. Marked R glut weakness with glut therex.      []  See Progress Note/Recertification   Patient will continue to benefit from skilled PT services to modify and progress therapeutic interventions, address functional mobility deficits, address ROM deficits, address strength deficits, analyze and address soft tissue restrictions, analyze and cue movement patterns, analyze and modify body mechanics/ergonomics, assess and modify postural abnormalities, address imbalance/dizziness and instruct in home and community integration to attain remaining goals. Progress toward goals / Updated goals:    · Goals for this certification period include and are to be achieved in   3-4  weeks: 1.   Patient will be independent with HEP in order to demonstrate ability to perform therapeutic exercises and continue progressing functional mobility upon D/C.   2.   Patient will improve FOTO score to 70% to demonstrate a meaningful improvement in functional mobility and increased quality of life  3.   Patient will improve gross LE strength to >/= 4-5 in order to decrease stress on low back       PLAN    [x]  Upgrade activities as tolerated YES Continue plan of care   []  Discharge due to :    []  Other:      Therapist: Wilbert Rodriguez DPT    Date: 11/7/2017 Time: 9:25 AM     Future Appointments  Date Time Provider Jj Guevara   11/7/2017 5:00 PM [facility]   11/9/2017 4:00 PM [facility]   11/14/2017 5:00 PM [facility]   11/16/2017 [facility]   11/20/2017 5:00 PM [address] [place] [address]   11/22/2017 5:00 PM [address] [place] [address]

## 2017-11-09 ENCOUNTER — HOSPITAL ENCOUNTER (OUTPATIENT)
Dept: PHYSICAL THERAPY | Age: 42
Discharge: HOME | End: 2017-11-09
Payer: OTHER GOVERNMENT

## 2017-11-09 PROCEDURE — 97530 THERAPEUTIC ACTIVITIES: CPT

## 2017-11-09 PROCEDURE — 97110 THERAPEUTIC EXERCISES: CPT

## 2017-11-09 NOTE — PROGRESS NOTES
PHYSICAL THERAPY - DAILY TREATMENT NOTE      Patient Name: Danielle Smoker        Date: 2017  : 1975   YES Patient  Verified  Visit #:     Insurance: Payor:  / Plan: [employer] DEPENDENTS / Product Type:  /      In time: 3:55 Out time: 5:00   Total Treatment Time: 65 min       TREATMENT AREA =  Lower back pain [M54.5]    SUBJECTIVE    Pain Level (on 0 to 10 scale):  0  / 10   Medication Changes/New allergies or changes in medical history, any new surgeries or procedures? NO    If yes, update Summary List   Subjective Functional Status/Changes:  []  No changes reported     \"I feel good, no pain at all. \"        OBJECTIVE     40 min Therapeutic Exercise:  [x]  See flow sheet   Rationale:      increase ROM, increase strength, improve coordination, improve balance and increase proprioception to improve the patients ability to increase pt's stability/mobility and improve functional activity and ability to perform ADL's    25 min Therapeutic Activity: Deadlifts, return to running program   Rationale:     decrease pain, increase ROM and increase postural awareness to improve patient's ability to return to running safely and inc ease/efficiency with bending/lifting technique    1 min Patient Education:  YES  Reviewed HEP   []  Progressed/Changed HEP based on: Other Objective/Functional Measures: Added KB deadlifts 20# KB in front of mirror for biofeedback. Progressed forward step ups to 8\" box with knee drive; then performed quickly while keeping LE on box with knee drive  Progressed forward lunges with barbell hold and SLS with step through pattern. Performed Day 2 of return to running program     Post Treatment Pain Level (on 0 to 10) scale:   0  / 10     ASSESSMENT    Assessment/Changes in Function:     Pt tolerating LE therex and return to running program without pain.      []  See Progress Note/Recertification   Patient will continue to benefit from skilled PT services to modify and progress therapeutic interventions, address functional mobility deficits, address ROM deficits, address strength deficits, analyze and address soft tissue restrictions, analyze and cue movement patterns, analyze and modify body mechanics/ergonomics, assess and modify postural abnormalities, address imbalance/dizziness and instruct in home and community integration to attain remaining goals. Progress toward goals / Updated goals:    · Goals for this certification period include and are to be achieved in   3-4  weeks:  1. Patient will be independent with HEP in order to demonstrate ability to perform therapeutic exercises and continue progressing functional mobility upon D/C.  2.   Patient will improve FOTO score to 70% to demonstrate a meaningful improvement in functional mobility and increased quality of life. Return to running progression  3. Patient will improve gross LE strength to >/= 4-5 in order to decrease stress on low back. Progressed LE strengthening, added deadlifts       PLAN    [x]  Upgrade activities as tolerated YES Continue plan of care   []  Discharge due to :    []  Other:      Therapist: Briana Snow DPT, Cert.  DN    Date: 11/9/2017 Time: 10:00 AM     Future Appointments  Date Time Provider Jj Guevara   11/9/2017 4:00 PM [facility]   11/14/2017 5:00 PM [facility]   11/16/2017 5:00 PM [facility]   11/20/2017 5:00 PM [address] [place] [address]   11/22/2017 5:00 PM [address] [place] [address]

## 2017-11-14 ENCOUNTER — HOSPITAL ENCOUNTER (OUTPATIENT)
Dept: PHYSICAL THERAPY | Age: 42
End: 2017-11-14
Payer: OTHER GOVERNMENT

## 2017-11-16 ENCOUNTER — APPOINTMENT (OUTPATIENT)
Dept: PHYSICAL THERAPY | Age: 42
End: 2017-11-16
Payer: OTHER GOVERNMENT

## 2017-11-20 ENCOUNTER — HOSPITAL ENCOUNTER (OUTPATIENT)
Dept: PHYSICAL THERAPY | Age: 42
Discharge: HOME | End: 2017-11-20
Payer: OTHER GOVERNMENT

## 2017-11-20 PROCEDURE — 97530 THERAPEUTIC ACTIVITIES: CPT

## 2017-11-20 PROCEDURE — 97110 THERAPEUTIC EXERCISES: CPT

## 2017-11-20 NOTE — PROGRESS NOTES
Jim Boudreaux [address]  [facility] PHYSICAL THERAPY  [address] [place] [address] Makenzie Hollingsworth, [address] - Phone: [phone]  Fax: [phone] [address]          Patient Name: Tee Boudreaux : 1975   Treatment/Medical Diagnosis: Lower back pain [M54.5]   Onset Date:  with recent exacerbation    Referral Source: Mal Severin, * Start of Care [facility]): 17   Prior Hospitalization: See Medical History Provider #: [id number]   Prior Level of Function: Independent with limitations with high level activity/recreation   Comorbidities: L5-S1 Discectomy    Medications: Verified on Patient Summary List   Visits from [place] campus: 13 Missed Visits: 3     Goal/Measure of Progress Goal Met? 1. Patient will be independent with HEP in order to demonstrate ability to perform therapeutic exercises and continue progressing functional mobility upon D/C. Status at last Eval: progressing Current Status: progressing Progressing    2. Patient will improve FOTO score to 70% to demonstrate a meaningful improvement in functional mobility and increased quality of life   Status at last Eval: 63 Current Status: 94 yes   3. Patient will improve gross LE strength to >/= 4-5 in order to decrease stress on low back   Status at last Eval: See below Current Status: See below yes     Therapy has consisted of glute strengthening with functional exercise progressions, flexibility exercises and beginning a walk to run program.    Key Functional Changes/Progress: Pt's FOTO score improved to 94 indicating 6% limitation in functional ability. The pt has met all strength goals and is independent with her current exercise program to continue to maintain stability while returning to her previous running protocols on various terrain. The pt still has pain with hip extension but no pain with functional activity performance.     MMT Deficits: (10/16/17 ) / current  Hip Flexion (4+ B) 5 B  Hip Extension (4- R 3 + L) 4 B  Hip IR (4 R 4- L) 5 B  Hip ER (3+ B) 5 R 4 L    Problem List: pain affecting function, decrease strength and decrease ADL/ functional abilitiies   Treatment Plan may include any combination of the following: Therapeutic exercise, Therapeutic activities, Neuromuscular re-education, Physical agent/modality, Gait/balance training, Manual therapy, Patient education, Self Care training and Functional mobility training  Patient Goal(s) has been updated and includes:      Goals for this certification period include and are to be achieved in   2-3  weeks:  1. Continue LTG 1.  2. Patient will report no increase in symptoms after completing phase 2 of the walk to run program.  Frequency / Duration:   Patient to be seen   2-3   times per week for   4-6    weeks:  G-Codes (GP): JOHNATHON  Assessments/Recommendations: The patient would benefit from one more follow-up to assure appropriate body mechanics with return to sport; however, if the pt reports a return of symptoms, the patient would benefit from continue return to sport training. If you have any questions/comments please contact us directly at [phone]. Thank you for allowing us to assist in the care of your patient. Therapist Signature: Marily Vickers DPT Date: 70/65/5359   Certification Period:  Reporting Period: NA  10/16/17 - 11/20/17 Time: 5:07 PM   NOTE TO PHYSICIAN:  PLEASE COMPLETE THE ORDERS BELOW AND FAX TO   [facility]: [phone]. If you are unable to process this request in 24 hours please contact our office: [phone].    ___ I have read the above report and request that my patient continue as recommended.   ___ I have read the above report and request that my patient continue therapy with the following changes/special instructions: ________________________________________________   ___ I have read the above report and request that my patient be discharged from therapy.      Physician Signature:        Date:       Time:

## 2017-11-21 NOTE — PROGRESS NOTES
PHYSICAL THERAPY - DAILY TREATMENT NOTE    Patient Name: Michel Degroot        Date: 2017  : 1975   YES Patient  Verified  Visit #:   15   of   18  Insurance: Payor:  / Plan: [employer] DEPENDENTS / Product Type:  /      In time: 5:00 Out time: 5:31   Total Treatment Time: 31     Medicare Time Tracking (below)   Total Timed Codes (min):  NA 1:1 Treatment Time:  NA     TREATMENT AREA =  Lower back pain [M54.5]    SUBJECTIVE    Pain Level (on 0 to 10 scale):  0  / 10   Medication Changes/New allergies or changes in medical history, any new surgeries or procedures? NO     If yes, update Summary List   Subjective Functional Status/Changes:  []  No changes reported     \"I feel great, no problems as I continue my walk to run program.\"          OBJECTIVE    8 min Therapeutic Exercise:  [x]  See flow sheet   Rationale:      increase ROM and increase strength to improve the patients ability to perform ADl's with improved core stability. 23 min Therapeutic Activity: Squats, deadlifts, FOTO administration. Rationale: To increase safety and efficiency with ADL's.     min Patient Education:  YES  Reviewed HEP   []  Progressed/Changed HEP based on:   Patient reports compliance     Other Objective/Functional Measures:    See PN. Post Treatment Pain Level (on 0 to 10) scale:   0  / 10     ASSESSMENT    Assessment/Changes in Function:     See PN.     []  See Progress Note/Recertification   Patient will continue to benefit from skilled PT services to modify and progress therapeutic interventions to attain remaining goals. Progress toward goals / Updated goals:    See PN.      PLAN    [x]  Upgrade activities as tolerated YES Continue plan of care   []  Discharge due to :    []  Other:      Therapist: Alicja Baptsite    Date: 2017 Time: 7:15 PM     Future Appointments  Date Time Provider Jj Guevara   2017 3:00 PM Alicja Baptiste [facility]

## 2017-11-22 ENCOUNTER — APPOINTMENT (OUTPATIENT)
Dept: PHYSICAL THERAPY | Age: 42
End: 2017-11-22
Payer: OTHER GOVERNMENT

## 2017-12-08 ENCOUNTER — APPOINTMENT (OUTPATIENT)
Dept: PHYSICAL THERAPY | Age: 42
End: 2017-12-08
Payer: OTHER GOVERNMENT

## 2017-12-21 ENCOUNTER — HOSPITAL ENCOUNTER (OUTPATIENT)
Dept: PHYSICAL THERAPY | Age: 42
Discharge: HOME | End: 2017-12-21
Payer: OTHER GOVERNMENT

## 2017-12-21 PROCEDURE — 97110 THERAPEUTIC EXERCISES: CPT

## 2017-12-21 NOTE — PROGRESS NOTES
Jim Boudreaux [address]  [facility] PHYSICAL THERAPY  [address] [place] [address] Maeknzie Hollingsworth, [address] - Phone: [phone]  Fax: [phone]  DISCHARGE SUMMARY FOR PHYSICAL THERAPY          Patient Name: Tee Boudreaux : 1975   Treatment/Medical Diagnosis: Lower back pain [M54.5]   Onset Date:  with recent exacerbation    Referral Source: Mal Severin, * Start of Care [facility]): 17   Prior Hospitalization: See Medical History Provider #: [id number]   Prior Level of Function: Independent with limitations with high level activity/recreation   Comorbidities: L5-S1 Discectomy    Medications: Verified on Patient Summary List   Visits from [facility]: 14 Missed Visits: 2     Goal/Measure of Progress Goal Met? 1. Patient will be independent with HEP in order to demonstrate ability to perform therapeutic exercises and continue progressing functional mobility upon D/C. Status at last Eval: NA Current Status: independent yes   2. Patient will report no increase in symptoms after completing phase 2 of the walk to run program.   Status at last Eval: Phase 1 Current Status: Phase 2, no lingering symptoms yes     Key Functional Changes/Progress: The pt was able to return to ADL performance independently without any pain. The pt met all goals and is independent with her HEP. G-Codes (GP): NA  Assessments/Recommendations: Discontinue therapy. Progressing towards or have reached established goals. If you have any questions/comments please contact us directly at [phone]. Thank you for allowing us to assist in the care of your patient. Therapist Signature: Son Sofia DPT Date: 2017    Reporting Period: 17 - 17 Time: 1:42 PM     ===========================================================================================  NOTE TO PHYSICIAN:  PLEASE COMPLETE THE ORDERS BELOW AND FAX TO   [facility]: [phone].   If you are unable to process this request in 24 hours please contact our office: [phone].    ___ I have read the above report and request that my patient continue as recommended.   ___ I have read the above report and request that my patient continue therapy with the following changes/special instructions: ________________________________________________   ___ I have read the above report and request that my patient be discharged from therapy.      Physician Signature:        Date:       Time:

## 2017-12-21 NOTE — PROGRESS NOTES
PHYSICAL THERAPY - DAILY TREATMENT NOTE    Patient Name: Jackie Hernández        Date: 2017  : 1975   YES Patient  Verified  Visit #:     Insurance: Payor:  / Plan: Kristopher Mascorro DEPENDENTS / Product Type:  /      In time: 11:00 Out time: 11:40   Total Treatment Time: 40     Medicare Time Tracking (below)   Total Timed Codes (min):  NA 1:1 Treatment Time:  NA     TREATMENT AREA =  Lower back pain [M54.5]    SUBJECTIVE    Pain Level (on 0 to 10 scale):  0  / 10   Medication Changes/New allergies or changes in medical history, any new surgeries or procedures? NO     If yes, update Summary List   Subjective Functional Status/Changes:  []  No changes reported     \"I've been really sick while I've been away. I deconditioned a little and haven't ran a lot. I haven't had any pain though. \"          OBJECTIVE    40 min Therapeutic Exercise:  [x]  See flow sheet   Rationale:      increase ROM and increase strength to improve the patients ability to perform ADL's with increased stability and flexibility. min Patient Education:  YES  Reviewed HEP   []  Progressed/Changed HEP based on:   Patient reports compliance     Other Objective/Functional Measures:    See DC note. Post Treatment Pain Level (on 0 to 10) scale:   0  / 10     ASSESSMENT    Assessment/Changes in Function:     See DC note. []  See Progress Note/Recertification   Patient will continue to benefit from skilled PT services to modify and progress therapeutic interventions to attain remaining goals. Progress toward goals / Updated goals:    See DC note. PLAN    [x]  Upgrade activities as tolerated YES Continue plan of care   []  Discharge due to :    []  Other:      Therapist: Enrique Chambers    Date: 2017 Time: 1:24 PM     No future appointments.

## 2019-08-12 ENCOUNTER — APPOINTMENT (OUTPATIENT)
Dept: PHYSICAL THERAPY | Age: 44
End: 2019-08-12
Payer: OTHER GOVERNMENT

## 2019-09-03 ENCOUNTER — HOSPITAL ENCOUNTER (OUTPATIENT)
Dept: PHYSICAL THERAPY | Age: 44
Discharge: HOME | End: 2019-09-03
Payer: OTHER GOVERNMENT

## 2019-09-03 PROCEDURE — 97162 PT EVAL MOD COMPLEX 30 MIN: CPT

## 2019-09-03 PROCEDURE — 97110 THERAPEUTIC EXERCISES: CPT

## 2019-09-03 PROCEDURE — 97530 THERAPEUTIC ACTIVITIES: CPT

## 2019-09-03 NOTE — PROGRESS NOTES
PHYSICAL THERAPY - DAILY TREATMENT NOTE    Patient Name: El Porras        Date: 9/3/2019  : 1975   YES Patient  Verified  Visit #:   1     Insurance: Payor:  / Plan: Ignacio Moody 74 / Product Type:  /      In time: 8:32 Out time: 9:30   Total Treatment Time: 58     Medicare/BCBS Time Tracking (below)   Total Timed Codes (min):  NA 1:1 Treatment Time:  NA     TREATMENT AREA =  L/S  SUBJECTIVE    Pain Level (on 0 to 10 scale):  5  / 10   Medication Changes/New allergies or changes in medical history, any new surgeries or procedures? YES    If yes, update Summary List   Subjective Functional Status/Changes:  []  No changes reported     History of Condition: Pt presents to PT with reports of chronic LBP that can be radicular in nature down to bilateral knees with the left > right. The pt was seen in this clinic roughly two years ago for LBP with running. The pt has since stopped running and recreational gym use due to chronic pain. The pt reports L/S flexion especially with lifting weights, to be her biggest aggravating factor and is treating her pain with prescribed flexeril. The pt has had an MRI and has an appointment within the next several days to review her results. Aggravating Factors: Alleviating Factors:     Previous Treatment:     PMHx:see chart    Social/Recreational/Work: frequent flying. Pt Goals: \"To get relief in order to return to running or exercising. \"     FOTO:57     LOW BACK EVALUATION    Objective:      Gait: excessive WB on the left    Posture: rounded shoulders, forward flexed    Palpation/Sensation:    (N - normal; R - reduced; MR - markedly reduced)       L/S ROM      Range   Effect  Strength (MMT)          Right        Left    Flex R  Psoas (L2,3) 4+ 4+   Ext MR LBP Quads (L3) 5 5   R-lat flex R  Ant tib(L4) 5 5   L-lat flex N  Hip Ext (S1, S2) 3 3-   R-rot R  Glut Med(L5) 4- 3+   L-rot N  Hamstrings(S1,S2) 4+ 4+      Hip IR/ER 4-/3+ 4-/3-     Strength (MMT)       Right     Left          Core: Sup Bridge     Core: Side Bridge     Core: Prone plank       Special Tests                       Right     Left          Flexibility         Right              Left    Leg Length symmetric  90/90 HS     Long-Sit Test Negative rotation  Nilam Cruz     Sl screen 3/5=70% LR   Dutch     Gaenslen test positive positive Hip IR (prone)     Franky/ANTIONE sign   Hip ER (prone)     Thigh thrust        Distraction        Lateral Compression positive positive      Sacral Provocation          Effect of:  DKC    Supine Bridge    SL Supine Bridge    Prone on Elbows    RFIS    REIL          SI Symmetry:    Standing        Supine            Prone                Dynamic      +/- R/L  ASIS    Fwd Flex    IC    Stork    PSIS    Seated FF      Sup to Sit:     Modalities Rationale: To improve activity tolerance for strengthening exercises and ADL's.     min [] Estim, type/location:                                      []  att     []  unatt     []  w/US     []  w/ice    []  w/heat    min []  Mechanical Traction: type/lbs                   []  pro   []  sup   []  int   []  cont    []  before manual    []  after manual    min []  Ultrasound, settings/location:      min []  Iontophoresis w/ dexamethasone, location:                                               []  take home patch       []  in clinic    min []  Ice     []  Heat    location/position:     min []  Vasopneumatic Device, press/temp:     min []  Other:    [] Skin assessment post-treatment (if applicable):    [x]  intact    []  redness- no adverse reaction     []redness - adverse reaction:      25 min Therapeutic Exercise:  [x]  See flow sheet   Rationale:      increase ROM and increase strength to improve the patients ability to perform ADL's with decreased radicular symptosm. .     8 min Therapeutic Activity: Work ergonomics education, sleeping postures    Rationale:    To improve safety and efficiency with ADL's.       min Patient Education:  []  Review HEP   []  Progressed/Changed HEP based on:   HEP initiated      Other Objective/Functional Measures    TTP ITB bilaterally at gerdy's tubercle  55 degres HS 90/90 bilaterally    Left leg lower at malleoli and ASIS and PSIS levels  Shift weight onto left LE     Post Treatment Pain Level (on 0 to 10) scale:  5   / 10     ASSESSMENT    Assessment/Changes in Function:     Justification for Eval Code Complexity: MODERATE  Patient History (low 0, mod 1-2, high 3-4): previous L5-S1 discectomy MODERATE     Examination (low 1-2, mod 3+, high 4+): LE AROM, LE MMT, L/S AROM, L/S repeated motions, SIJ screening and special tests HIGH   Clinical Presentation (low: stable/uncomplicated; mod: evolving; high: unstable/unpredictable): evolving symptoms with CROW MODERATE  Clinical Decision Making (low , mod 26-74, high 1-25): 57 MODERATE     []  See Progress Note/Recertification   Patient will continue to benefit from skilled PT services to modify and progress therapeutic interventions, address functional mobility deficits, address ROM deficits, address strength deficits, analyze and address soft tissue restrictions, analyze and cue movement patterns, analyze and modify body mechanics/ergonomics and assess and modify postural abnormalities to attain remaining goals.    Progress toward goals / Updated goals:    Goals established, see PoC     PLAN    [x]  Upgrade activities as tolerated YES Continue plan of care   []  Discharge due to :    [x]  Other: Initiate PoC     Therapist: Jerad Shoemaker    Date: 9/3/2019 Time: 8:41 AM

## 2019-09-03 NOTE — PROGRESS NOTES
[address] PHYSICAL THERAPY  [address] [place] [address] Eligio Hollingsworth, [address] - Phone: [phone]  Fax: [phone] / [address] [facility]  Patient Name: Romaine Mcdermott : 1975   Medical   Diagnosis: Low back pain [M54.5] Treatment Diagnosis: Bilateral LE radiculopathy with likely L2-L4 disk involvement, SIJ dysfunction   Onset Date: Chronic several years     Referral Source: Koki Junior., * Start of Care [facility]): 9/3/2019   Prior Hospitalization: See medical history Provider #: [id number]   Prior Level of Function: Gradual decline in activity level over the past year due to chronic LBP   Comorbidities: L5-S1 discectomy several    Medications: Verified on Patient Summary List   The Plan of Care and following information is based on the information from the initial evaluation.   ===========================================================================================  Assessment / key information:  Romaine Mcdermott is a 38 y.o.  female with Dx: Low back pain [M54.5], signs and symptoms consistent with bilateral LE radiculopathy likely due to disk involvement at L2-L4 and sacroiliac joint dysfunction. Pt presents to PT with reports of chronic LBP that can be radicular in nature down to bilateral knees with the left > right. The pt was seen in this clinic roughly two years ago for LBP with running. The pt has since stopped running and recreational gym use due to chronic pain. The pt reports L/S flexion especially with lifting weights, to be her biggest aggravating factor and is treating her pain with prescribed flexeril. The pt has had an MRI and has an appointment within the next several days to review her results. Objective:  The pt demonstrates the following functional deficits: 1) gait deficits with transient right lateral shift and excessive WB on the left LE, 2) inability to squat with even WB, 3) positive hamstrings 90/90 bilaterally (55 degrees), 4) multiple hip girdle MMT deficits noted below, 5) positive SIJ cluster tests revealing right posterior rotated inominate and 6) bilateral LE radicular symptoms until just inferior to the knee that centralizes with repeated L/S extension. L/S ROM      Range   Effect  Strength (MMT)          Right        Left    Flex R   Psoas (L2,3) 4+ 4+   Ext MR LBP Quads (L3) 5 5   R-lat flex R   Ant tib(L4) 5 5   L-lat flex N   Hip Ext (S1, S2) 3 3-   R-rot R   Glut Med(L5) 4- 3+   L-rot N   Hamstrings(S1,S2) 4+ 4+         Hip IR/ER 4-/3+ 4-/3-     The patient's FOTO score of 57 points indicates 43% limitation in functional ability. The patient would benefit from skilled PT to address the below listed impairments.  Thank you for your referral.  ===========================================================================================  Eval Complexity: History: MEDIUM  Complexity : 1-2 comorbidities / personal factors will impact the outcome/ POC Exam:HIGH Complexity : 4+ Standardized tests and measures addressing body structure, function, activity limitation and / or participation in recreation  Presentation: MEDIUM Complexity : Evolving with changing characteristics  Clinical Decision Making:MEDIUM Complexity : FOTO score of 26-74Overall Complexity:MEDIUM    Problem List: pain affecting function, decrease ROM, decrease strength, impaired gait/ balance, decrease ADL/ functional abilitiies, decrease activity tolerance, decrease flexibility/ joint mobility and decrease transfer abilities   Treatment Plan may include any combination of the following: Therapeutic exercise, Therapeutic activities, Neuromuscular re-education, Physical agent/modality, Gait/balance training, Manual therapy, Aquatic therapy, Patient education, Self Care training, Functional mobility training, Home safety training and Stair training    Patient / Family readiness to learn indicated by: asking questions, trying to perform skills and interest  Persons(s) to be included in education: patient (P)  Barriers to Learning/Limitations: None  Measures taken: na   Patient Goal (s): \"To get relief in order to return to running or exercising. \"   Patient self reported health status: good  Rehabilitation Potential: good   Short Term Goals: To be accomplished in  2-3  weeks:  1. Patient will demonstrate compliance with HEP for symptom management at home. 2. Patient will demonstrates no more than 40 degrees on the hamstring 90/90 test bilaterally to decrease L/S flexion with amb. 3. Patient will am with symmetrical WB 2 x 30 ft to decrease LBP and improve activity tolerance.  Long Term Goals: To be accomplished in  4-6  weeks:  1. Patient will be independent with HEP to self-manage and prevent symptoms upon DC. 2.  Patient will improve FOTO score to at least 68 points to indicate improved functional status. 3.  Patient will centralize bilateral LE sx to level of L/S to demonstrate effectiveness of directional preference exercises and decrease risk of L/S dysfunction  4. Patient will demonstrate at least 4/5 hip ER MMT bilaterally to improve pelvic stability with am and work duties. Frequency / Duration:   Patient to be seen  2-3  times per week for 4-6  weeks:  Patient / Caregiver education and instruction: self care, activity modification and exercises    Therapist Signature: Alpa Dillon DPT Date: 9/9/1400   Certification Period: 9/3/19 - 12/2/19 Time: 10:50 AM   ===========================================================================================  I certify that the above Physical Therapy Services are being furnished while the patient is under my care. I agree with the treatment plan and certify that this therapy is necessary. Physician Signature:        Date:       Time:     Please sign and return to [employer] or you may fax the signed copy to [phone]. Thank you.

## 2019-09-04 ENCOUNTER — HOSPITAL ENCOUNTER (OUTPATIENT)
Dept: PHYSICAL THERAPY | Age: 44
Discharge: HOME | End: 2019-09-04
Payer: OTHER GOVERNMENT

## 2019-09-04 PROCEDURE — 97110 THERAPEUTIC EXERCISES: CPT

## 2019-09-04 NOTE — PROGRESS NOTES
PHYSICAL THERAPY - DAILY TREATMENT NOTE    Patient Name: Andi Espinal        Date: 2019  : 1975   YES Patient  Verified  Visit #:   2     Insurance: Payor: JAMIE / Plan: Ignacio Moody 74 / Product Type:  /      In time: 2:25 Out time: 03:09   Total Treatment Time: 44     Medicare/[facility] Time Tracking (below)   Total Timed Codes (min):  NA 1:1 Treatment Time:  NA     TREATMENT AREA = Low back pain [M54.5]    SUBJECTIVE    Pain Level (on 0 to 10 scale):   10   Medication Changes/New allergies or changes in medical history, any new surgeries or procedures? NO    If yes, update Summary List   Subjective Functional Status/Changes:  []  No changes reported     \"My legs feel tight but I have no pain down the legs\"          OBJECTIVE    Therapeutic Procedures:  Min Procedure Specifics + Rationale   n/a [x]  Patient Education (performed throughout session) [x] Review HEP    [] Progressed/Changed HEP based on:   [] proper performance and advancement of Therex/TA   [] reduction in pain level    [] increased functional capacity       [] change in directional preference   44 [x] Therapeutic Exercise   [x]  See Flowsheet   Rationale: increase ROM and increase strength to improve the patients ability to participate in ADL's      Other Objective/Functional Measures:    Initiated program based on deficits noted during initial evaluation. Mod vcs for proper exercise form. See flow sheet for more details. Progressed therex per flow sheet. Post Treatment Pain Level (on 0 to 10) scale:   4  / 10     ASSESSMENT    Assessment/Changes in Function:     Pt tolerated first session post initial evaluation well with mild fatigue noted towards end range of sets.    []  See Plan of Care  []  See Progress Note/ Recertification  []  See Discharge Summary        Patient will continue to benefit from skilled PT services to modify and progress therapeutic interventions, address functional mobility deficits, address ROM deficits, address strength deficits, analyze and address soft tissue restrictions, analyze and cue movement patterns, analyze and modify body mechanics/ergonomics, assess and modify postural abnormalities, address imbalance/dizziness and instruct in home and community integration  to attain remaining goals   Progress toward goals / Updated goals:    Initiated POC     PLAN    [x]  Upgrade activities as tolerated  [x]  Update interventions per flow sheet YES Continue plan of care   []  Discharge due to :    []  Other:      Therapist: Jonatan Jin DPT    Date: 9/4/2019 Time: 12:01 PM     Future Appointments   Date Time Provider Jj Guevara   9/4/2019  2:30 PM Deisy Davison, [address] [place] [address]   9/9/2019  3:30 PM Deisy Davison [facility]11[facility]2019  [facility]00 [facility]   9/16/2019  2:30 PM [facility]   9/18/2019  8:30 AM Deisy Davison [facility]   9/24/2019  8:30 AM Deisy Davison [facility]   9/26/2019  2:30 PM Gianluca Doherty [facility]   9/30/2019  2:00 PM [facility]

## 2019-09-09 ENCOUNTER — HOSPITAL ENCOUNTER (OUTPATIENT)
Dept: PHYSICAL THERAPY | Age: 44
Discharge: HOME | End: 2019-09-09
Payer: OTHER GOVERNMENT

## 2019-09-09 PROCEDURE — 97110 THERAPEUTIC EXERCISES: CPT

## 2019-09-09 NOTE — PROGRESS NOTES
PHYSICAL THERAPY - DAILY TREATMENT NOTE    Patient Name: Chele Tang        Date: 2019  : 1975   YES Patient  Verified  Visit #:   3     Insurance: Payor: JAMIE / Plan: Ignacio Moody 74 / Product Type:  /      In time: 3:15 Out time: 4:02   Total Treatment Time: 47     Medicare/BCBS Time Tracking (below)   Total Timed Codes (min):  NA 1:1 Treatment Time:  NA     TREATMENT AREA = Low back pain [M54.5]    SUBJECTIVE    Pain Level (on 0 to 10 scale):  3   10   Medication Changes/New allergies or changes in medical history, any new surgeries or procedures? NO    If yes, update Summary List   Subjective Functional Status/Changes:  []  No changes reported     \"My back doesn't feel too bad today\"          OBJECTIVE    Therapeutic Procedures:  Min Procedure Specifics + Rationale   n/a [x]  Patient Education (performed throughout session) [x] Review HEP    [] Progressed/Changed HEP based on:   [] proper performance and advancement of Therex/TA   [] reduction in pain level    [] increased functional capacity       [] change in directional preference   47 [x] Therapeutic Exercise   [x]  See Flowsheet   Rationale: increase ROM and increase strength to improve the patients ability to participate in ADL's      Other Objective/Functional Measures:    PT added OOV deadbugs, OOV planks, dead lifts and increased reps/sets/resistance as noted on the flow sheet. Pt reports peripheralization of symptoms post dead lifts however able to centralize symptoms with STEPHANIE and REIL. See flow sheet for more details. Progressed therex per flow sheet. Post Treatment Pain Level (on 0 to 10) scale:   3   10     ASSESSMENT    Assessment/Changes in Function:     Pt able to control peripheral symptoms with repeated extension movements.    []  See Plan of Care  []  See Progress Note/ Recertification  []  See Discharge Summary        Patient will continue to benefit from skilled PT services to modify and progress therapeutic interventions, address functional mobility deficits, address ROM deficits, address strength deficits, analyze and address soft tissue restrictions, analyze and cue movement patterns, analyze and modify body mechanics/ergonomics, assess and modify postural abnormalities, address imbalance/dizziness and instruct in home and community integration  to attain remaining goals   Progress toward goals / Updated goals: · Short Term Goals: To be accomplished in  2-3  weeks:  1. Patient will demonstrate compliance with HEP for symptom management at home. 2. Patient will demonstrates no more than 40 degrees on the hamstring 90/90 test bilaterally to decrease L/S flexion with amb. 3. Patient will am with symmetrical WB 2 x 30 ft to decrease LBP and improve activity tolerance. · Long Term Goals: To be accomplished in  4-6  weeks:  1. Patient will be independent with HEP to self-manage and prevent symptoms upon DC. 2.  Patient will improve FOTO score to at least 68 points to indicate improved functional status. 3.  Patient will centralize bilateral LE sx to level of L/S to demonstrate effectiveness of directional preference exercises and decrease risk of L/S dysfunction. Progressing well as pt able to centralize symptoms after exacerbation. 4.  Patient will demonstrate at least 4/5 hip ER MMT bilaterally to improve pelvic stability with am and work duties.      PLAN    [x]  Upgrade activities as tolerated  [x]  Update interventions per flow sheet YES Continue plan of care   []  Discharge due to :    []  Other:      Therapist: Jessenia Epstein DPT    Date: 9/9/2019 Time: 9:19 AM     Future Appointments   Date Time Provider Jj Guevara   9/9/2019  3:30 PM Preet Naidu PT [facility]   9/11/2019  4:00 PM [facility]   9/16/2019  2:30 PM [facility]   9/18/2019  8:30 AM Preet Naidu PT [facility]   9/24/2019  8:30 AM Preet Naidu PT [facility]   9/26/2019  2:30 PM Scar Clancy PT [facility]   9/30/2019  2:00 PM Maribell [facility]

## 2019-09-11 ENCOUNTER — HOSPITAL ENCOUNTER (OUTPATIENT)
Dept: PHYSICAL THERAPY | Age: 44
Discharge: HOME | End: 2019-09-11
Payer: OTHER GOVERNMENT

## 2019-09-11 PROCEDURE — 97530 THERAPEUTIC ACTIVITIES: CPT

## 2019-09-11 PROCEDURE — 97110 THERAPEUTIC EXERCISES: CPT

## 2019-09-11 NOTE — PROGRESS NOTES
PHYSICAL THERAPY - DAILY TREATMENT NOTE    Patient Name: Penny Lombard        Date: 2019  : 1975   YES Patient  Verified  Visit #:   4     Insurance: Payor: JAMIE / Plan: Ignacio Moody 73 / Product Type:  /      In time: 4:05 Out time: 4:55   Total Treatment Time: 50     Medicare/BCBS Time Tracking (below)   Total Timed Codes (min):  NA 1:1 Treatment Time:  NA     TREATMENT AREA =  Low back pain [M54.5]    SUBJECTIVE    Pain Level (on 0 to 10 scale):  3  / 10   Medication Changes/New allergies or changes in medical history, any new surgeries or procedures? NO     If yes, update Summary List   Subjective Functional Status/Changes:  []  No changes reported     \"I feel a little better, but I'm enjoying feeling worked out. You guys are kicking my butt. \"          OBJECTIVE    Modalities Rationale: To improve activity tolerance for exercise performance and ADL's.    min [] Estim, type/location:                                      []  att     []  unatt     []  w/US     []  w/ice    []  w/heat    min []  Mechanical Traction: type/lbs                   []  pro   []  sup   []  int   []  cont    []  before manual    []  after manual    min []  Ultrasound, settings/location:      min []  Iontophoresis w/ dexamethasone, location:                                               []  take home patch       []  in clinic   NA min []  Ice     []  Heat    location/position:     min []  Vasopneumatic Device, press/temp:     min []  Other:    [x] Skin assessment post-treatment (if applicable):   [x]  intact    []  redness- no adverse reaction     []redness - adverse reaction:    40 min Therapeutic Exercise:  [x]  See flow sheet   Rationale:      increase ROM and increase strength to improve the patients ability to perform ADL's with improved core stability. 8 min Therapeutic Activity: Sidestepping, relative SLS with RDL   Rationale:    To increase safety and efficiency with ADL's.    2 min Manual Therapy: left LE pull MET to correct inominate alignment. To decrease strain on L/S and SIJ with ADL performance. min Patient Education:  YES  Reviewed HEP   []  Progressed/Changed HEP based on:   Patient reports compliance     Other Objective/Functional Measures:    Pt reports hamstring cramping with performance of prone glute kicks and bridges and RDL. Pt is able to alleviate cramping with multiple repetitions of 1/2 moon hamstring stretches and REIL. Remaining symptoms in the left glute abolish with performance of self piriformis release, MET described above and piriforms stretching. Pt requires cueing to perform RDL's without excessive L/S rotation. Pt demonstrates more compensation when performing RDL on the left > right. Post Treatment Pain Level (on 0 to 10) scale:   0  / 10     ASSESSMENT    Assessment/Changes in Function:     WIll continue to progress strengthening/AROM per activity tolerance. []  See Progress Note/Recertification   Patient will continue to benefit from skilled PT services to modify and progress therapeutic interventions, address functional mobility deficits, address ROM deficits, address strength deficits, analyze and address soft tissue restrictions, analyze and cue movement patterns, analyze and modify body mechanics/ergonomics and assess and modify postural abnormalities to attain remaining goals. Progress toward goals / Updated goals: · Short Term Goals: To be accomplished in  2-3  weeks:  1. Patient will demonstrate compliance with HEP for symptom management at home. 2. Patient will demonstrates no more than 40 degrees on the hamstring 90/90 test bilaterally to decrease L/S flexion with amb. Addressed with 1/2 moon. 3. Patient will am with symmetrical WB 2 x 30 ft to decrease LBP and improve activity tolerance. Addressed with RDL's. · Long Term Goals: To be accomplished in  4-6  weeks:  1.   Patient will be independent with HEP to self-manage and prevent symptoms upon DC. 2.  Patient will improve FOTO score to at least 68 points to indicate improved functional status. Addressed with deadlifts and hip hinges and sidestepping. 3.  Patient will centralize bilateral LE sx to level of L/S to demonstrate effectiveness of directional preference exercises and decrease risk of L/S dysfunction. Addressed with core strengthening exercises and REIL. 4.  Patient will demonstrate at least 4/5 hip ER MMT bilaterally to improve pelvic stability with am and work duties. Addressed with clams.      PLAN    [x]  Upgrade activities as tolerated YES Continue plan of care   []  Discharge due to :    []  Other:      Therapist: Xiomara Noyola    Date: 9/11/2019 Time: 5:20 PM     Future Appointments   Date Time Provider Jj Guevara   9/16/2019  2:30 PM [facility]   9/24/2019  8:30 AM Sammy Levi [facility]   9/26/2019  2:30 PM Scar Clancy [facility]   9/30/2019  2:00 PM [facility]

## 2019-09-16 ENCOUNTER — HOSPITAL ENCOUNTER (OUTPATIENT)
Dept: PHYSICAL THERAPY | Age: 44
Discharge: HOME | End: 2019-09-16
Payer: OTHER GOVERNMENT

## 2019-09-16 PROCEDURE — 97110 THERAPEUTIC EXERCISES: CPT

## 2019-09-16 NOTE — PROGRESS NOTES
PHYSICAL THERAPY - DAILY TREATMENT NOTE    Patient Name: Joann Badillo        Date: 2019  : 1975   YES Patient  Verified  Visit #:   5     Insurance: Payor:  / Plan: Anish Callejas / Product Type:  /      In time: 2:24 Out time: 3:20   Total Treatment Time: 56     Medicare/[facility] Time Tracking (below)   Total Timed Codes (min):  NA 1:1 Treatment Time:  NA     TREATMENT AREA =  Low back pain [M54.5]    SUBJECTIVE    Pain Level (on 0 to 10 scale):  8  / 10   Medication Changes/New allergies or changes in medical history, any new surgeries or procedures? NO     If yes, update Summary List   Subjective Functional Status/Changes:  []  No changes reported     \"I have a lot more pain than usual. I was helping move equipment out of my garage and I was working in the yard. I did all of that because I was feeling so much better. \"          OBJECTIVE    Modalities Rationale: To improve activity tolerance for exercise performance and ADL's.    min [] Estim, type/location:                                      []  att     []  unatt     []  w/US     []  w/ice    []  w/heat    min []  Mechanical Traction: type/lbs                   []  pro   []  sup   []  int   []  cont    []  before manual    []  after manual    min []  Ultrasound, settings/location:      min []  Iontophoresis w/ dexamethasone, location:                                               []  take home patch       []  in clinic   NA min []  Ice     []  Heat    location/position:     min []  Vasopneumatic Device, press/temp:     min []  Other:    [x] Skin assessment post-treatment (if applicable):   [x]  intact    []  redness- no adverse reaction     []redness - adverse reaction:    56 min Therapeutic Exercise:  [x]  See flow sheet   Rationale:      increase ROM and increase strength to improve the patients ability to perform ADL's with improved core stability and decreased radicular symptoms.      min Patient Education:  YES  Reviewed HEP   []  Progressed/Changed HEP based on:   Patient reports compliance     Other Objective/Functional Measures:    Pt reports to PT unable to tolerate prone on elbows with right LE radicular symptoms. Transitioned to prone lying for 2 minutes and progressed to prone on elbows for 2 minutes with no right LE symptoms. Progressed to prone prop-up on the mat increasing incrementally making sure for pt symptoms to centralize before working up to the next progression. Pt symptoms were decreased and centralized to the low back with the prop-up; however, when attempting to come back down to perform press-up's with the mat in neutral, pt experienced severe LBP. Pt performed small-range press-up's with manual overpressure at L5-S1. Pt symptoms decreased and pt was able to perform CROW. CROW decreased symptoms and maintained centralization; however, pt still had 8/10 pain leaving the clinic. Post Treatment Pain Level (on 0 to 10) scale:   8  / 10     ASSESSMENT    Assessment/Changes in Function:     WIll only perform ant-rotational core stabilization exercises and decrease intensity of exercises to adjust for increase in symptoms. Pr educated to put all exercises on hold aside from prone press-up's and CROW and to perform direction of preference exercises every waking hour until symptoms subside. []  See Progress Note/Recertification   Patient will continue to benefit from skilled PT services to modify and progress therapeutic interventions, address functional mobility deficits, address ROM deficits, address strength deficits, analyze and address soft tissue restrictions, analyze and cue movement patterns, analyze and modify body mechanics/ergonomics and assess and modify postural abnormalities to attain remaining goals. Progress toward goals / Updated goals: · Short Term Goals: To be accomplished in  2-3  weeks:  1.  Patient will demonstrate compliance with HEP for symptom management at home. 2. Patient will demonstrates no more than 40 degrees on the hamstring 90/90 test bilaterally to decrease L/S flexion with amb. 3. Patient will am with symmetrical WB 2 x 30 ft to decrease LBP and improve activity tolerance. Addressed with prone press up progression. · Long Term Goals: To be accomplished in  4-6  weeks:  1. Patient will be independent with HEP to self-manage and prevent symptoms upon DC. 2.  Patient will improve FOTO score to at least 68 points to indicate improved functional status. 3.  Patient will centralize bilateral LE sx to level of L/S to demonstrate effectiveness of directional preference exercises and decrease risk of L/S dysfunction. Addressed with prone press-up progression. 4.  Patient will demonstrate at least 4/5 hip ER MMT bilaterally to improve pelvic stability with am and work duties.      PLAN    [x]  Upgrade activities as tolerated YES Continue plan of care   []  Discharge due to :    []  Other:      Therapist: Bennett Pozo    Date: 9/16/2019 Time: 4:34 PM     Future Appointments   Date Time Provider Jj Guevara   9/24/2019  8:30 AM Derek Tabor PT [facility]   9/26/2019  2:30 PM [place] [id number] [facility]   9/30/2019  2:00 PM Velia Wetzel [facility]

## 2019-09-18 ENCOUNTER — APPOINTMENT (OUTPATIENT)
Dept: PHYSICAL THERAPY | Age: 44
End: 2019-09-18
Payer: OTHER GOVERNMENT

## 2019-09-24 ENCOUNTER — HOSPITAL ENCOUNTER (OUTPATIENT)
Dept: PHYSICAL THERAPY | Age: 44
Discharge: HOME | End: 2019-09-24
Payer: OTHER GOVERNMENT

## 2019-09-24 PROCEDURE — 97110 THERAPEUTIC EXERCISES: CPT

## 2019-09-24 PROCEDURE — 97140 MANUAL THERAPY 1/> REGIONS: CPT

## 2019-09-24 NOTE — PROGRESS NOTES
PHYSICAL THERAPY - DAILY TREATMENT NOTE    Patient Name: Orlando Hester        Date: 2019  : 1975   YES Patient  Verified  Visit #:     Insurance: Payor: JAMIE / Plan: Ignacio Moody 73 / Product Type:  /      In time: 08:25 Out time: 09:05   Total Treatment Time: 40     Medicare/BCBS Time Tracking (below)   Total Timed Codes (min):  NA 1:1 Treatment Time:  NA     TREATMENT AREA = Low back pain [M54.5]    SUBJECTIVE    Pain Level (on 0 to 10 scale):  9  / 10   Medication Changes/New allergies or changes in medical history, any new surgeries or procedures? NO    If yes, update Summary List   Subjective Functional Status/Changes:  []  No changes reported     \"I almost had to go to the ER d/t the pain\"          OBJECTIVE    Therapeutic Procedures:  Min Procedure Specifics + Rationale   n/a [x]  Patient Education (performed throughout session) [x] Review HEP    [] Progressed/Changed HEP based on:   [] proper performance and advancement of Therex/TA   [] reduction in pain level    [] increased functional capacity       [] change in directional preference   25 [x] Therapeutic Exercise   [x]  See Flowsheet   Rationale: increase ROM and increase strength to improve the patients ability to participate in ADL's    15 [x]  Manual Therapy         STM/DTM and medium dynamic cupping along bilateral L/S paraspinals. Rationale: decrease pain, increase ROM, increase tissue extensibility, decrease trigger points and increase postural awareness to attain functional use and participation with ADL's     Other Objective/Functional Measures:    PT added prone glute sets, H/L Add, 90/90 SLR and seated sciatic nerve glides. Pt reports significantly decreased symptoms when she takes Naproxen. See flow sheet for more details. Progressed therex per flow sheet.    Post Treatment Pain Level (on 0 to 10) scale:   8  / 10     ASSESSMENT    Assessment/Changes in Function:     PT and pt discussed possible D/C NV if she continues to report significantly elevated pain levels and weakness of LE's. PT encouraged pt to take medications as prescribed. []  See Plan of Care  []  See Progress Note/ Recertification  []  See Discharge Summary        Patient will continue to benefit from skilled PT services to modify and progress therapeutic interventions, address functional mobility deficits, address ROM deficits, address strength deficits, analyze and address soft tissue restrictions, analyze and cue movement patterns, analyze and modify body mechanics/ergonomics, assess and modify postural abnormalities, address imbalance/dizziness and instruct in home and community integration  to attain remaining goals   Progress toward goals / Updated goals: · Short Term Goals: To be accomplished in  2-3  weeks:  1. Patient will demonstrate compliance with HEP for symptom management at home. MET, per patient report. 2. Patient will demonstrates no more than 40 degrees on the hamstring 90/90 test bilaterally to decrease L/S flexion with amb. 3. Patient will am with symmetrical WB 2 x 30 ft to decrease LBP and improve activity tolerance. Addressed with prone press up progression. · Long Term Goals: To be accomplished in  4-6  weeks:  1.  Patient will be independent with HEP to self-manage and prevent symptoms upon DC. 2.  Patient will improve FOTO score to at least 68 points to indicate improved functional status. 3.  Patient will centralize bilateral LE sx to level of L/S to demonstrate effectiveness of directional preference exercises and decrease risk of L/S dysfunction. Addressed with prone press-up progression. 4.  Patient will demonstrate at least 4/5 hip ER MMT bilaterally to improve pelvic stability with am and work duties.      PLAN    [x]  Upgrade activities as tolerated  [x]  Update interventions per flow sheet YES Continue plan of care   []  Discharge due to :    []  Other:      Therapist: Jennifer Oliver Ashlee Delvalle DPT    Date: 9/24/2019 Time: 7:19 AM     Future Appointments   Date Time Provider Jj Guevara   9/24/2019  8:30 AM [facility]   9/26/2019  2:30 PM [facility]   9/30/2019  2:00 PM [facility]

## 2019-09-26 ENCOUNTER — APPOINTMENT (OUTPATIENT)
Dept: PHYSICAL THERAPY | Age: 44
End: 2019-09-26
Payer: OTHER GOVERNMENT

## 2019-09-30 ENCOUNTER — APPOINTMENT (OUTPATIENT)
Dept: PHYSICAL THERAPY | Age: 44
End: 2019-09-30
Payer: OTHER GOVERNMENT

## 2019-11-05 NOTE — PROGRESS NOTES
[facility] PHYSICAL THERAPY  [address] [place] [address] [address] [place], [address] - Phone: [phone]  Fax: [phone]  DISCHARGE SUMMARY FOR PHYSICAL THERAPY          Patient Name: Mark Wood : 1975   Treatment/Medical Diagnosis: Low back pain [M54.5]   Onset Date: Chronic    Referral Source: Javier De Jesusman., * Start of Care [facility]): 2019   Prior Hospitalization: See medical hx Provider #: [id number]   Prior Level of Function: Gradual decline in activity level over the past year due to chronic LBP   Comorbidities: L5-S1 discectomy several    Medications: Verified on Patient Summary List   Visits from [facility]: 6 Missed Visits: 0     SUMMARY OF TREATMENT  Patient's POC has consisted of therex, therapeutic activities, manual therapy prn, modalities prn, pt. education, and a comprehensive HEP. Treatment strategies used to address functional mobility deficits, ROM deficits, strength deficits, analyze and address soft tissue restrictions, analyze and cue movement patterns, analyze and modify body mechanics/ergonomics, assess and modify postural abnormalities and instruct in home and community integration. Key Functional Changes/Progress: Pt was receiving PT at clinic for chronic L/S pain with hx of surgical intervention. PT reports minimal progress made towards set goals and has been referred to a \"back specialist.\"Pt is scheduled for an epidural at end of November and has been referred to [facility]. Pt to be D/C at current time. Assessments/Recommendations: Discontinue therapy due to lack of appreciable progress towards goals. If you have any questions/comments please contact us directly at [phone]. Thank you for allowing us to assist in the care of your patient.     Therapist Signature:  Christopher Morel DPT Date: 2019   Reporting Period: 2019-2019 Time: 7:44 PM      Certification Period: NA       NOTE TO PHYSICIAN:  PLEASE COMPLETE THE ORDERS BELOW AND FAX TO   [facility]: [phone]. If you are unable to process this request in 24 hours please contact our office: [phone].    ___ I have read the above report and request that my patient be discharged from therapy.      Physician Signature:        Date:       Time: